# Patient Record
Sex: FEMALE | Race: WHITE | Employment: OTHER | URBAN - METROPOLITAN AREA
[De-identification: names, ages, dates, MRNs, and addresses within clinical notes are randomized per-mention and may not be internally consistent; named-entity substitution may affect disease eponyms.]

---

## 2017-01-01 ENCOUNTER — CARE COORDINATION (OUTPATIENT)
Dept: CARE COORDINATION | Age: 82
End: 2017-01-01

## 2017-01-01 ENCOUNTER — APPOINTMENT (OUTPATIENT)
Dept: GENERAL RADIOLOGY | Age: 82
End: 2017-01-01
Payer: MEDICARE

## 2017-01-01 ENCOUNTER — OFFICE VISIT (OUTPATIENT)
Dept: PODIATRY | Age: 82
End: 2017-01-01
Payer: MEDICARE

## 2017-01-01 ENCOUNTER — TELEPHONE (OUTPATIENT)
Dept: FAMILY MEDICINE CLINIC | Age: 82
End: 2017-01-01

## 2017-01-01 ENCOUNTER — HOSPITAL ENCOUNTER (EMERGENCY)
Age: 82
Discharge: HOME | End: 2017-11-24
Attending: EMERGENCY MEDICINE
Payer: MEDICARE

## 2017-01-01 ENCOUNTER — PROCEDURE VISIT (OUTPATIENT)
Dept: CARDIOLOGY | Age: 82
End: 2017-01-01
Payer: MEDICARE

## 2017-01-01 ENCOUNTER — OFFICE VISIT (OUTPATIENT)
Dept: FAMILY MEDICINE CLINIC | Age: 82
End: 2017-01-01
Payer: MEDICARE

## 2017-01-01 ENCOUNTER — OFFICE VISIT (OUTPATIENT)
Dept: PRIMARY CARE CLINIC | Age: 82
End: 2017-01-01
Payer: MEDICARE

## 2017-01-01 ENCOUNTER — HOSPITAL ENCOUNTER (OUTPATIENT)
Dept: GENERAL RADIOLOGY | Age: 82
End: 2017-01-01
Payer: MEDICARE

## 2017-01-01 ENCOUNTER — TELEPHONE (OUTPATIENT)
Dept: INTERNAL MEDICINE | Age: 82
End: 2017-01-01

## 2017-01-01 ENCOUNTER — OFFICE VISIT (OUTPATIENT)
Dept: ORTHOPEDIC SURGERY | Age: 82
End: 2017-01-01
Payer: MEDICARE

## 2017-01-01 ENCOUNTER — HOSPITAL ENCOUNTER (OUTPATIENT)
Age: 82
Setting detail: SPECIMEN
Discharge: HOME | End: 2017-10-02
Payer: MEDICARE

## 2017-01-01 ENCOUNTER — APPOINTMENT (OUTPATIENT)
Dept: CT IMAGING | Age: 82
End: 2017-01-01
Payer: MEDICARE

## 2017-01-01 ENCOUNTER — TELEPHONE (OUTPATIENT)
Dept: FAMILY MEDICINE CLINIC | Age: 82
End: 2017-01-01
Payer: MEDICARE

## 2017-01-01 ENCOUNTER — APPOINTMENT (OUTPATIENT)
Dept: INTERVENTIONAL RADIOLOGY/VASCULAR | Age: 82
End: 2017-01-01
Payer: MEDICARE

## 2017-01-01 ENCOUNTER — HOSPITAL ENCOUNTER (EMERGENCY)
Age: 82
Discharge: HOME | End: 2017-08-05
Attending: EMERGENCY MEDICINE
Payer: MEDICARE

## 2017-01-01 ENCOUNTER — HOSPITAL ENCOUNTER (OUTPATIENT)
Dept: GENERAL RADIOLOGY | Age: 82
Discharge: HOME | End: 2017-09-13
Payer: MEDICARE

## 2017-01-01 ENCOUNTER — OFFICE VISIT (OUTPATIENT)
Dept: CARDIOLOGY | Age: 82
End: 2017-01-01
Payer: MEDICARE

## 2017-01-01 ENCOUNTER — CARE COORDINATOR VISIT (OUTPATIENT)
Dept: CARE COORDINATION | Age: 82
End: 2017-01-01

## 2017-01-01 ENCOUNTER — HOSPITAL ENCOUNTER (EMERGENCY)
Age: 82
Discharge: HOME | End: 2017-11-19
Attending: EMERGENCY MEDICINE
Payer: MEDICARE

## 2017-01-01 ENCOUNTER — HOSPITAL ENCOUNTER (EMERGENCY)
Age: 82
Discharge: HOME | End: 2017-07-13
Attending: EMERGENCY MEDICINE
Payer: MEDICARE

## 2017-01-01 ENCOUNTER — HOSPITAL ENCOUNTER (OUTPATIENT)
Age: 82
Setting detail: SPECIMEN
Discharge: HOME | End: 2017-11-28
Payer: MEDICARE

## 2017-01-01 VITALS
SYSTOLIC BLOOD PRESSURE: 145 MMHG | OXYGEN SATURATION: 95 % | HEART RATE: 70 BPM | HEIGHT: 64 IN | DIASTOLIC BLOOD PRESSURE: 83 MMHG | WEIGHT: 135 LBS | BODY MASS INDEX: 23.05 KG/M2 | RESPIRATION RATE: 12 BRPM

## 2017-01-01 VITALS
SYSTOLIC BLOOD PRESSURE: 124 MMHG | HEART RATE: 76 BPM | BODY MASS INDEX: 23.49 KG/M2 | DIASTOLIC BLOOD PRESSURE: 76 MMHG | WEIGHT: 137.6 LBS | HEIGHT: 64 IN | RESPIRATION RATE: 20 BRPM

## 2017-01-01 VITALS
BODY MASS INDEX: 23.39 KG/M2 | HEART RATE: 91 BPM | DIASTOLIC BLOOD PRESSURE: 76 MMHG | RESPIRATION RATE: 18 BRPM | HEIGHT: 64 IN | SYSTOLIC BLOOD PRESSURE: 124 MMHG | OXYGEN SATURATION: 98 % | WEIGHT: 137 LBS

## 2017-01-01 VITALS
HEART RATE: 67 BPM | BODY MASS INDEX: 22.02 KG/M2 | RESPIRATION RATE: 13 BRPM | OXYGEN SATURATION: 97 % | DIASTOLIC BLOOD PRESSURE: 58 MMHG | WEIGHT: 129 LBS | TEMPERATURE: 97.9 F | HEIGHT: 64 IN | SYSTOLIC BLOOD PRESSURE: 123 MMHG

## 2017-01-01 VITALS
OXYGEN SATURATION: 98 % | DIASTOLIC BLOOD PRESSURE: 56 MMHG | HEART RATE: 67 BPM | RESPIRATION RATE: 12 BRPM | SYSTOLIC BLOOD PRESSURE: 109 MMHG | TEMPERATURE: 97.7 F

## 2017-01-01 VITALS
HEART RATE: 72 BPM | SYSTOLIC BLOOD PRESSURE: 145 MMHG | SYSTOLIC BLOOD PRESSURE: 128 MMHG | RESPIRATION RATE: 12 BRPM | WEIGHT: 135 LBS | BODY MASS INDEX: 23.17 KG/M2 | TEMPERATURE: 97.8 F | DIASTOLIC BLOOD PRESSURE: 73 MMHG | OXYGEN SATURATION: 99 % | WEIGHT: 133 LBS | OXYGEN SATURATION: 97 % | DIASTOLIC BLOOD PRESSURE: 74 MMHG | BODY MASS INDEX: 22.83 KG/M2 | HEART RATE: 67 BPM

## 2017-01-01 VITALS
HEIGHT: 64 IN | HEART RATE: 60 BPM | TEMPERATURE: 98 F | SYSTOLIC BLOOD PRESSURE: 102 MMHG | BODY MASS INDEX: 22.2 KG/M2 | DIASTOLIC BLOOD PRESSURE: 60 MMHG | OXYGEN SATURATION: 97 % | WEIGHT: 130 LBS

## 2017-01-01 VITALS
HEIGHT: 64 IN | HEART RATE: 68 BPM | WEIGHT: 137.2 LBS | RESPIRATION RATE: 20 BRPM | DIASTOLIC BLOOD PRESSURE: 60 MMHG | SYSTOLIC BLOOD PRESSURE: 118 MMHG | BODY MASS INDEX: 23.42 KG/M2

## 2017-01-01 VITALS
DIASTOLIC BLOOD PRESSURE: 68 MMHG | BODY MASS INDEX: 23.76 KG/M2 | SYSTOLIC BLOOD PRESSURE: 122 MMHG | WEIGHT: 138.4 LBS | HEART RATE: 60 BPM

## 2017-01-01 VITALS
BODY MASS INDEX: 23.05 KG/M2 | SYSTOLIC BLOOD PRESSURE: 110 MMHG | HEIGHT: 64 IN | DIASTOLIC BLOOD PRESSURE: 60 MMHG | HEART RATE: 75 BPM | WEIGHT: 135 LBS

## 2017-01-01 VITALS
WEIGHT: 137 LBS | DIASTOLIC BLOOD PRESSURE: 72 MMHG | WEIGHT: 135 LBS | BODY MASS INDEX: 23.05 KG/M2 | SYSTOLIC BLOOD PRESSURE: 128 MMHG | HEART RATE: 60 BPM | HEART RATE: 72 BPM | DIASTOLIC BLOOD PRESSURE: 68 MMHG | HEIGHT: 64 IN | SYSTOLIC BLOOD PRESSURE: 100 MMHG | BODY MASS INDEX: 23.39 KG/M2 | HEIGHT: 64 IN

## 2017-01-01 VITALS
SYSTOLIC BLOOD PRESSURE: 118 MMHG | WEIGHT: 136 LBS | HEIGHT: 64 IN | DIASTOLIC BLOOD PRESSURE: 64 MMHG | HEART RATE: 72 BPM | OXYGEN SATURATION: 98 % | BODY MASS INDEX: 23.22 KG/M2

## 2017-01-01 VITALS
RESPIRATION RATE: 18 BRPM | WEIGHT: 135.4 LBS | BODY MASS INDEX: 23.12 KG/M2 | SYSTOLIC BLOOD PRESSURE: 108 MMHG | HEART RATE: 72 BPM | DIASTOLIC BLOOD PRESSURE: 60 MMHG | HEIGHT: 64 IN

## 2017-01-01 DIAGNOSIS — Z91.81 HISTORY OF FALL: ICD-10-CM

## 2017-01-01 DIAGNOSIS — I11.0 HYPERTENSIVE HEART DISEASE WITH HEART FAILURE (HCC): Primary | ICD-10-CM

## 2017-01-01 DIAGNOSIS — I25.2 OLD MYOCARDIAL INFARCTION: ICD-10-CM

## 2017-01-01 DIAGNOSIS — J40 BRONCHITIS: Primary | ICD-10-CM

## 2017-01-01 DIAGNOSIS — R53.1 GENERAL WEAKNESS: ICD-10-CM

## 2017-01-01 DIAGNOSIS — K21.9 GASTROESOPHAGEAL REFLUX DISEASE WITHOUT ESOPHAGITIS: ICD-10-CM

## 2017-01-01 DIAGNOSIS — I49.5 SICK SINUS SYNDROME (HCC): Primary | ICD-10-CM

## 2017-01-01 DIAGNOSIS — M25.561 CHRONIC PAIN OF BOTH KNEES: ICD-10-CM

## 2017-01-01 DIAGNOSIS — I25.5 ISCHEMIC CARDIOMYOPATHY: ICD-10-CM

## 2017-01-01 DIAGNOSIS — I50.9 CONGESTIVE HEART FAILURE, UNSPECIFIED CONGESTIVE HEART FAILURE CHRONICITY, UNSPECIFIED CONGESTIVE HEART FAILURE TYPE: ICD-10-CM

## 2017-01-01 DIAGNOSIS — M62.81 MUSCLE WEAKNESS (GENERALIZED): ICD-10-CM

## 2017-01-01 DIAGNOSIS — M54.2 NECK PAIN ON LEFT SIDE: ICD-10-CM

## 2017-01-01 DIAGNOSIS — I50.9 CONGESTIVE HEART FAILURE, UNSPECIFIED CONGESTIVE HEART FAILURE CHRONICITY, UNSPECIFIED CONGESTIVE HEART FAILURE TYPE: Primary | ICD-10-CM

## 2017-01-01 DIAGNOSIS — M54.30 SCIATICA, UNSPECIFIED LATERALITY: ICD-10-CM

## 2017-01-01 DIAGNOSIS — I47.20 VENTRICULAR TACHYCARDIA: ICD-10-CM

## 2017-01-01 DIAGNOSIS — Z87.440 PERSONAL HISTORY OF URINARY TRACT INFECTION: ICD-10-CM

## 2017-01-01 DIAGNOSIS — I48.91 ATRIAL FIBRILLATION, UNSPECIFIED TYPE (HCC): ICD-10-CM

## 2017-01-01 DIAGNOSIS — K59.00 CONSTIPATION, UNSPECIFIED CONSTIPATION TYPE: ICD-10-CM

## 2017-01-01 DIAGNOSIS — E78.5 HYPERLIPIDEMIA, UNSPECIFIED HYPERLIPIDEMIA TYPE: Primary | ICD-10-CM

## 2017-01-01 DIAGNOSIS — Z91.81 PERSONAL HISTORY OF FALL: ICD-10-CM

## 2017-01-01 DIAGNOSIS — R26.9 GAIT ABNORMALITY: ICD-10-CM

## 2017-01-01 DIAGNOSIS — F41.9 ANXIETY: ICD-10-CM

## 2017-01-01 DIAGNOSIS — M54.31 SCIATICA OF RIGHT SIDE: Primary | ICD-10-CM

## 2017-01-01 DIAGNOSIS — I25.10 ATHEROSCLEROSIS OF NATIVE CORONARY ARTERY OF NATIVE HEART WITHOUT ANGINA PECTORIS: ICD-10-CM

## 2017-01-01 DIAGNOSIS — Z99.81 DEPENDENCE ON SUPPLEMENTAL OXYGEN: ICD-10-CM

## 2017-01-01 DIAGNOSIS — I50.32 CHRONIC DIASTOLIC CONGESTIVE HEART FAILURE (HCC): ICD-10-CM

## 2017-01-01 DIAGNOSIS — I49.5 SSS (SICK SINUS SYNDROME) (HCC): ICD-10-CM

## 2017-01-01 DIAGNOSIS — G89.29 CHRONIC PAIN OF BOTH KNEES: Primary | ICD-10-CM

## 2017-01-01 DIAGNOSIS — Z87.891 PERSONAL HISTORY OF TOBACCO USE, PRESENTING HAZARDS TO HEALTH: ICD-10-CM

## 2017-01-01 DIAGNOSIS — R53.1 WEAKNESS GENERALIZED: ICD-10-CM

## 2017-01-01 DIAGNOSIS — B35.1 DERMATOPHYTOSIS OF NAIL: ICD-10-CM

## 2017-01-01 DIAGNOSIS — N28.9 RENAL INSUFFICIENCY: ICD-10-CM

## 2017-01-01 DIAGNOSIS — L89.91: ICD-10-CM

## 2017-01-01 DIAGNOSIS — M25.562 CHRONIC PAIN OF BOTH KNEES: Primary | ICD-10-CM

## 2017-01-01 DIAGNOSIS — I49.5 SICK SINUS SYNDROME (HCC): ICD-10-CM

## 2017-01-01 DIAGNOSIS — Z95.0 CARDIAC PACEMAKER IN SITU: Primary | ICD-10-CM

## 2017-01-01 DIAGNOSIS — J44.9 CHRONIC OBSTRUCTIVE PULMONARY DISEASE, UNSPECIFIED COPD TYPE (HCC): Primary | ICD-10-CM

## 2017-01-01 DIAGNOSIS — R22.9 LOCAL SUPERFICIAL SWELLING, MASS OR LUMP: Primary | ICD-10-CM

## 2017-01-01 DIAGNOSIS — Z95.0 CARDIAC PACEMAKER IN SITU: ICD-10-CM

## 2017-01-01 DIAGNOSIS — R11.0 NAUSEA WITHOUT VOMITING: ICD-10-CM

## 2017-01-01 DIAGNOSIS — I25.10 CORONARY ARTERY DISEASE INVOLVING NATIVE CORONARY ARTERY OF NATIVE HEART WITHOUT ANGINA PECTORIS: ICD-10-CM

## 2017-01-01 DIAGNOSIS — K62.89 RECTAL IRRITATION: ICD-10-CM

## 2017-01-01 DIAGNOSIS — Z51.5 ENCOUNTER FOR PALLIATIVE CARE: ICD-10-CM

## 2017-01-01 DIAGNOSIS — B35.1 DERMATOPHYTOSIS OF NAIL: Primary | ICD-10-CM

## 2017-01-01 DIAGNOSIS — M17.0 BILATERAL PRIMARY OSTEOARTHRITIS OF KNEE: Primary | ICD-10-CM

## 2017-01-01 DIAGNOSIS — R29.898 WEAKNESS OF BOTH LOWER EXTREMITIES: Primary | ICD-10-CM

## 2017-01-01 DIAGNOSIS — R44.1 HALLUCINATIONS, VISUAL: Primary | ICD-10-CM

## 2017-01-01 DIAGNOSIS — M25.562 CHRONIC PAIN OF BOTH KNEES: ICD-10-CM

## 2017-01-01 DIAGNOSIS — M79.604 PAIN IN BOTH LOWER EXTREMITIES: Primary | ICD-10-CM

## 2017-01-01 DIAGNOSIS — R19.7 DIARRHEA OF PRESUMED INFECTIOUS ORIGIN: ICD-10-CM

## 2017-01-01 DIAGNOSIS — R53.1 WEAKNESS: ICD-10-CM

## 2017-01-01 DIAGNOSIS — G89.29 CHRONIC PAIN OF BOTH KNEES: ICD-10-CM

## 2017-01-01 DIAGNOSIS — I73.9 PVD (PERIPHERAL VASCULAR DISEASE) (HCC): Primary | ICD-10-CM

## 2017-01-01 DIAGNOSIS — R26.9 GAIT ABNORMALITY: Primary | ICD-10-CM

## 2017-01-01 DIAGNOSIS — E86.0 DEHYDRATION: Primary | ICD-10-CM

## 2017-01-01 DIAGNOSIS — M54.40 ACUTE LOW BACK PAIN WITH SCIATICA, SCIATICA LATERALITY UNSPECIFIED, UNSPECIFIED BACK PAIN LATERALITY: Primary | ICD-10-CM

## 2017-01-01 DIAGNOSIS — H35.3190 NONEXUDATIVE AGE-RELATED MACULAR DEGENERATION, UNSPECIFIED EYE, STAGE UNSPECIFIED: ICD-10-CM

## 2017-01-01 DIAGNOSIS — Z23 NEED FOR INFLUENZA VACCINATION: ICD-10-CM

## 2017-01-01 DIAGNOSIS — M79.605 PAIN IN BOTH LOWER EXTREMITIES: Primary | ICD-10-CM

## 2017-01-01 DIAGNOSIS — R60.9 PERIPHERAL EDEMA: ICD-10-CM

## 2017-01-01 DIAGNOSIS — R53.1 WEAKNESS: Primary | ICD-10-CM

## 2017-01-01 DIAGNOSIS — F32.4 MAJOR DEPRESSIVE DISORDER WITH SINGLE EPISODE, IN PARTIAL REMISSION (HCC): ICD-10-CM

## 2017-01-01 DIAGNOSIS — M25.561 CHRONIC PAIN OF BOTH KNEES: Primary | ICD-10-CM

## 2017-01-01 DIAGNOSIS — I10 ESSENTIAL HYPERTENSION: Primary | ICD-10-CM

## 2017-01-01 DIAGNOSIS — I10 ESSENTIAL HYPERTENSION: ICD-10-CM

## 2017-01-01 DIAGNOSIS — R60.9 PERIPHERAL EDEMA: Primary | ICD-10-CM

## 2017-01-01 DIAGNOSIS — L89.151 DECUBITUS ULCER OF SACRAL REGION, STAGE 1: ICD-10-CM

## 2017-01-01 DIAGNOSIS — R44.1 HALLUCINATIONS, VISUAL: ICD-10-CM

## 2017-01-01 DIAGNOSIS — R26.89 BALANCE DISORDER: ICD-10-CM

## 2017-01-01 LAB
-: ABNORMAL
-: ABNORMAL
ABSOLUTE EOS #: 0.1 K/UL (ref 0–0.4)
ABSOLUTE EOS #: 0.1 K/UL (ref 0–0.4)
ABSOLUTE IMMATURE GRANULOCYTE: ABNORMAL K/UL (ref 0–0.3)
ABSOLUTE LYMPH #: 1.1 K/UL (ref 1–4.8)
ABSOLUTE LYMPH #: 1.6 K/UL (ref 1–4.8)
ABSOLUTE MONO #: 0.4 K/UL (ref 0.1–1.2)
ABSOLUTE MONO #: 0.5 K/UL (ref 0.1–1.2)
AMORPHOUS: ABNORMAL
AMORPHOUS: ABNORMAL
ANION GAP SERPL CALCULATED.3IONS-SCNC: 11 MMOL/L (ref 9–17)
ANION GAP SERPL CALCULATED.3IONS-SCNC: 15 MMOL/L (ref 9–17)
BACTERIA: ABNORMAL
BACTERIA: ABNORMAL
BASOPHILS # BLD: 1 %
BASOPHILS # BLD: 1 % (ref 0–1)
BASOPHILS ABSOLUTE: 0 K/UL (ref 0–0.2)
BASOPHILS ABSOLUTE: 0 K/UL (ref 0–0.2)
BILIRUBIN URINE: NEGATIVE
BILIRUBIN URINE: NEGATIVE
BNP INTERPRETATION: ABNORMAL
BUN BLDV-MCNC: 22 MG/DL (ref 8–23)
BUN BLDV-MCNC: 25 MG/DL (ref 8–23)
BUN/CREAT BLD: 23 (ref 9–20)
BUN/CREAT BLD: 24 (ref 9–20)
CALCIUM SERPL-MCNC: 9.1 MG/DL (ref 8.6–10.4)
CALCIUM SERPL-MCNC: 9.5 MG/DL (ref 8.6–10.4)
CASTS UA: ABNORMAL /LPF (ref 0–2)
CASTS UA: ABNORMAL /LPF (ref 0–2)
CHLORIDE BLD-SCNC: 92 MMOL/L (ref 98–107)
CHLORIDE BLD-SCNC: 94 MMOL/L (ref 98–107)
CO2: 27 MMOL/L (ref 20–31)
CO2: 29 MMOL/L (ref 20–31)
COLOR: ABNORMAL
COLOR: ABNORMAL
COMMENT UA: ABNORMAL
COMMENT UA: ABNORMAL
CREAT SERPL-MCNC: 0.97 MG/DL (ref 0.5–0.9)
CREAT SERPL-MCNC: 1.04 MG/DL (ref 0.5–0.9)
CRYSTALS, UA: ABNORMAL /HPF
CRYSTALS, UA: ABNORMAL /HPF
CULTURE: NORMAL
CULTURE: NORMAL
DIFFERENTIAL TYPE: ABNORMAL
DIFFERENTIAL TYPE: ABNORMAL
DIRECT EXAM: NORMAL
EOSINOPHILS RELATIVE PERCENT: 2 %
EOSINOPHILS RELATIVE PERCENT: 2 % (ref 1–7)
EPITHELIAL CELLS UA: ABNORMAL /HPF (ref 0–5)
EPITHELIAL CELLS UA: ABNORMAL /HPF (ref 0–5)
GFR AFRICAN AMERICAN: ABNORMAL ML/MIN
GFR AFRICAN AMERICAN: ABNORMAL ML/MIN
GFR NON-AFRICAN AMERICAN: ABNORMAL ML/MIN
GFR NON-AFRICAN AMERICAN: ABNORMAL ML/MIN
GFR SERPL CREATININE-BSD FRML MDRD: ABNORMAL ML/MIN/{1.73_M2}
GLUCOSE BLD-MCNC: 106 MG/DL (ref 70–99)
GLUCOSE BLD-MCNC: 130 MG/DL (ref 70–99)
GLUCOSE URINE: NEGATIVE
GLUCOSE URINE: NEGATIVE
HCT VFR BLD CALC: 28.6 % (ref 36–46)
HCT VFR BLD CALC: 34 % (ref 36–46)
HEMOGLOBIN: 11.3 G/DL (ref 12–16)
HEMOGLOBIN: 9.2 G/DL (ref 12–16)
IMMATURE GRANULOCYTES: ABNORMAL %
KETONES, URINE: NEGATIVE
KETONES, URINE: NEGATIVE
LEUKOCYTE ESTERASE, URINE: ABNORMAL
LEUKOCYTE ESTERASE, URINE: ABNORMAL
LYMPHOCYTES # BLD: 21 %
LYMPHOCYTES # BLD: 24 % (ref 16–46)
Lab: NORMAL
Lab: NORMAL
MCH RBC QN AUTO: 33.9 PG (ref 26–34)
MCH RBC QN AUTO: 35.4 PG (ref 26–34)
MCHC RBC AUTO-ENTMCNC: 32 G/DL (ref 31–37)
MCHC RBC AUTO-ENTMCNC: 33.1 G/DL (ref 31–37)
MCV RBC AUTO: 105.9 FL (ref 80–100)
MCV RBC AUTO: 106.9 FL (ref 80–100)
MONOCYTES # BLD: 7 % (ref 4–11)
MONOCYTES # BLD: 8 %
MUCUS: ABNORMAL
MUCUS: ABNORMAL
NITRITE, URINE: NEGATIVE
NITRITE, URINE: NEGATIVE
OTHER OBSERVATIONS UA: ABNORMAL
OTHER OBSERVATIONS UA: ABNORMAL
PDW BLD-RTO: 14.1 % (ref 11–14.5)
PDW BLD-RTO: 14.8 % (ref 11–14.5)
PH UA: 5.5 (ref 5–6)
PH UA: 6 (ref 5–6)
PLATELET # BLD: 185 K/UL (ref 140–450)
PLATELET # BLD: 193 K/UL (ref 140–450)
PLATELET ESTIMATE: ABNORMAL
PLATELET ESTIMATE: ABNORMAL
PMV BLD AUTO: 8 FL (ref 6–12)
PMV BLD AUTO: 8.8 FL (ref 6–12)
POTASSIUM SERPL-SCNC: 4.1 MMOL/L (ref 3.7–5.3)
POTASSIUM SERPL-SCNC: 4.4 MMOL/L (ref 3.7–5.3)
PRO-BNP: 3460 PG/ML
PROTEIN UA: NEGATIVE
PROTEIN UA: NEGATIVE
RBC # BLD: 2.7 M/UL (ref 4–5.2)
RBC # BLD: 3.19 M/UL (ref 4–5.2)
RBC # BLD: ABNORMAL 10*6/UL
RBC # BLD: ABNORMAL 10*6/UL
RBC UA: ABNORMAL /HPF (ref 0–4)
RBC UA: ABNORMAL /HPF (ref 0–4)
RENAL EPITHELIAL, UA: ABNORMAL /HPF
RENAL EPITHELIAL, UA: ABNORMAL /HPF
SEG NEUTROPHILS: 66 % (ref 43–77)
SEG NEUTROPHILS: 68 %
SEGMENTED NEUTROPHILS ABSOLUTE COUNT: 3.7 K/UL (ref 1.8–7.7)
SEGMENTED NEUTROPHILS ABSOLUTE COUNT: 4.4 K/UL (ref 1.8–7.7)
SODIUM BLD-SCNC: 134 MMOL/L (ref 135–144)
SODIUM BLD-SCNC: 134 MMOL/L (ref 135–144)
SPECIFIC GRAVITY UA: 1 (ref 1.01–1.02)
SPECIFIC GRAVITY UA: 1 (ref 1.01–1.02)
SPECIMEN DESCRIPTION: NORMAL
STATUS: NORMAL
STATUS: NORMAL
TRICHOMONAS: ABNORMAL
TRICHOMONAS: ABNORMAL
TURBIDITY: ABNORMAL
TURBIDITY: ABNORMAL
URINE HGB: ABNORMAL
URINE HGB: NEGATIVE
UROBILINOGEN, URINE: NORMAL
UROBILINOGEN, URINE: NORMAL
WBC # BLD: 5.4 K/UL (ref 3.5–11)
WBC # BLD: 6.6 K/UL (ref 3.5–11)
WBC # BLD: ABNORMAL 10*3/UL
WBC # BLD: ABNORMAL 10*3/UL
WBC UA: ABNORMAL /HPF (ref 0–4)
WBC UA: ABNORMAL /HPF (ref 0–4)
YEAST: ABNORMAL
YEAST: ABNORMAL

## 2017-01-01 PROCEDURE — 1036F TOBACCO NON-USER: CPT | Performed by: ORTHOPAEDIC SURGERY

## 2017-01-01 PROCEDURE — 96372 THER/PROPH/DIAG INJ SC/IM: CPT

## 2017-01-01 PROCEDURE — G8427 DOCREV CUR MEDS BY ELIG CLIN: HCPCS | Performed by: FAMILY MEDICINE

## 2017-01-01 PROCEDURE — 87493 C DIFF AMPLIFIED PROBE: CPT

## 2017-01-01 PROCEDURE — 81001 URINALYSIS AUTO W/SCOPE: CPT

## 2017-01-01 PROCEDURE — 1123F ACP DISCUSS/DSCN MKR DOCD: CPT | Performed by: NURSE PRACTITIONER

## 2017-01-01 PROCEDURE — G8420 CALC BMI NORM PARAMETERS: HCPCS | Performed by: FAMILY MEDICINE

## 2017-01-01 PROCEDURE — 11721 DEBRIDE NAIL 6 OR MORE: CPT | Performed by: PODIATRIST

## 2017-01-01 PROCEDURE — 93000 ELECTROCARDIOGRAM COMPLETE: CPT | Performed by: INTERNAL MEDICINE

## 2017-01-01 PROCEDURE — 99213 OFFICE O/P EST LOW 20 MIN: CPT | Performed by: NURSE PRACTITIONER

## 2017-01-01 PROCEDURE — 1036F TOBACCO NON-USER: CPT | Performed by: PODIATRIST

## 2017-01-01 PROCEDURE — 99283 EMERGENCY DEPT VISIT LOW MDM: CPT

## 2017-01-01 PROCEDURE — 99214 OFFICE O/P EST MOD 30 MIN: CPT | Performed by: FAMILY MEDICINE

## 2017-01-01 PROCEDURE — 1123F ACP DISCUSS/DSCN MKR DOCD: CPT | Performed by: FAMILY MEDICINE

## 2017-01-01 PROCEDURE — 4040F PNEUMOC VAC/ADMIN/RCVD: CPT | Performed by: FAMILY MEDICINE

## 2017-01-01 PROCEDURE — G0179 MD RECERTIFICATION HHA PT: HCPCS | Performed by: FAMILY MEDICINE

## 2017-01-01 PROCEDURE — G8420 CALC BMI NORM PARAMETERS: HCPCS | Performed by: NURSE PRACTITIONER

## 2017-01-01 PROCEDURE — G8427 DOCREV CUR MEDS BY ELIG CLIN: HCPCS | Performed by: NURSE PRACTITIONER

## 2017-01-01 PROCEDURE — 80048 BASIC METABOLIC PNL TOTAL CA: CPT

## 2017-01-01 PROCEDURE — 70450 CT HEAD/BRAIN W/O DYE: CPT

## 2017-01-01 PROCEDURE — 2580000003 HC RX 258: Performed by: EMERGENCY MEDICINE

## 2017-01-01 PROCEDURE — 1090F PRES/ABSN URINE INCON ASSESS: CPT | Performed by: FAMILY MEDICINE

## 2017-01-01 PROCEDURE — 85025 COMPLETE CBC W/AUTO DIFF WBC: CPT

## 2017-01-01 PROCEDURE — G8427 DOCREV CUR MEDS BY ELIG CLIN: HCPCS | Performed by: ORTHOPAEDIC SURGERY

## 2017-01-01 PROCEDURE — 6360000002 HC RX W HCPCS: Performed by: EMERGENCY MEDICINE

## 2017-01-01 PROCEDURE — 6370000000 HC RX 637 (ALT 250 FOR IP): Performed by: EMERGENCY MEDICINE

## 2017-01-01 PROCEDURE — 1090F PRES/ABSN URINE INCON ASSESS: CPT | Performed by: ORTHOPAEDIC SURGERY

## 2017-01-01 PROCEDURE — 20610 DRAIN/INJ JOINT/BURSA W/O US: CPT | Performed by: ORTHOPAEDIC SURGERY

## 2017-01-01 PROCEDURE — 99214 OFFICE O/P EST MOD 30 MIN: CPT | Performed by: INTERNAL MEDICINE

## 2017-01-01 PROCEDURE — 1036F TOBACCO NON-USER: CPT | Performed by: FAMILY MEDICINE

## 2017-01-01 PROCEDURE — 90662 IIV NO PRSV INCREASED AG IM: CPT | Performed by: FAMILY MEDICINE

## 2017-01-01 PROCEDURE — 4040F PNEUMOC VAC/ADMIN/RCVD: CPT | Performed by: ORTHOPAEDIC SURGERY

## 2017-01-01 PROCEDURE — G0008 ADMIN INFLUENZA VIRUS VAC: HCPCS | Performed by: FAMILY MEDICINE

## 2017-01-01 PROCEDURE — 99284 EMERGENCY DEPT VISIT MOD MDM: CPT

## 2017-01-01 PROCEDURE — G8420 CALC BMI NORM PARAMETERS: HCPCS | Performed by: ORTHOPAEDIC SURGERY

## 2017-01-01 PROCEDURE — 73502 X-RAY EXAM HIP UNI 2-3 VIEWS: CPT

## 2017-01-01 PROCEDURE — G0180 MD CERTIFICATION HHA PATIENT: HCPCS | Performed by: FAMILY MEDICINE

## 2017-01-01 PROCEDURE — 73502 X-RAY EXAM HIP UNI 2-3 VIEWS: CPT | Performed by: RADIOLOGY

## 2017-01-01 PROCEDURE — G8598 ASA/ANTIPLAT THER USED: HCPCS | Performed by: FAMILY MEDICINE

## 2017-01-01 PROCEDURE — 73562 X-RAY EXAM OF KNEE 3: CPT

## 2017-01-01 PROCEDURE — 99213 OFFICE O/P EST LOW 20 MIN: CPT | Performed by: FAMILY MEDICINE

## 2017-01-01 PROCEDURE — G8598 ASA/ANTIPLAT THER USED: HCPCS | Performed by: NURSE PRACTITIONER

## 2017-01-01 PROCEDURE — G8484 FLU IMMUNIZE NO ADMIN: HCPCS | Performed by: FAMILY MEDICINE

## 2017-01-01 PROCEDURE — 70450 CT HEAD/BRAIN W/O DYE: CPT | Performed by: RADIOLOGY

## 2017-01-01 PROCEDURE — 1123F ACP DISCUSS/DSCN MKR DOCD: CPT | Performed by: ORTHOPAEDIC SURGERY

## 2017-01-01 PROCEDURE — 93971 EXTREMITY STUDY: CPT | Performed by: RADIOLOGY

## 2017-01-01 PROCEDURE — 1090F PRES/ABSN URINE INCON ASSESS: CPT | Performed by: NURSE PRACTITIONER

## 2017-01-01 PROCEDURE — 99285 EMERGENCY DEPT VISIT HI MDM: CPT

## 2017-01-01 PROCEDURE — 93288 INTERROG EVL PM/LDLS PM IP: CPT | Performed by: INTERNAL MEDICINE

## 2017-01-01 PROCEDURE — 72100 X-RAY EXAM L-S SPINE 2/3 VWS: CPT

## 2017-01-01 PROCEDURE — 96361 HYDRATE IV INFUSION ADD-ON: CPT

## 2017-01-01 PROCEDURE — G8598 ASA/ANTIPLAT THER USED: HCPCS | Performed by: ORTHOPAEDIC SURGERY

## 2017-01-01 PROCEDURE — 36415 COLL VENOUS BLD VENIPUNCTURE: CPT

## 2017-01-01 PROCEDURE — 93971 EXTREMITY STUDY: CPT

## 2017-01-01 PROCEDURE — 4040F PNEUMOC VAC/ADMIN/RCVD: CPT | Performed by: NURSE PRACTITIONER

## 2017-01-01 PROCEDURE — 96374 THER/PROPH/DIAG INJ IV PUSH: CPT

## 2017-01-01 PROCEDURE — 83880 ASSAY OF NATRIURETIC PEPTIDE: CPT

## 2017-01-01 PROCEDURE — 1036F TOBACCO NON-USER: CPT | Performed by: NURSE PRACTITIONER

## 2017-01-01 PROCEDURE — 87086 URINE CULTURE/COLONY COUNT: CPT

## 2017-01-01 PROCEDURE — 99212 OFFICE O/P EST SF 10 MIN: CPT | Performed by: ORTHOPAEDIC SURGERY

## 2017-01-01 RX ORDER — SIMVASTATIN 10 MG
TABLET ORAL
Qty: 90 TABLET | Refills: 3 | Status: SHIPPED | OUTPATIENT
Start: 2017-01-01

## 2017-01-01 RX ORDER — ALBUTEROL SULFATE 2.5 MG/3ML
2.5 SOLUTION RESPIRATORY (INHALATION) EVERY 6 HOURS PRN
Qty: 120 VIAL | Refills: 1 | Status: SHIPPED | OUTPATIENT
Start: 2017-01-01

## 2017-01-01 RX ORDER — 0.9 % SODIUM CHLORIDE 0.9 %
500 INTRAVENOUS SOLUTION INTRAVENOUS ONCE
Status: COMPLETED | OUTPATIENT
Start: 2017-01-01 | End: 2017-01-01

## 2017-01-01 RX ORDER — MAGNESIUM GLUCONATE 27 MG(500)
250 TABLET ORAL 2 TIMES DAILY
COMMUNITY

## 2017-01-01 RX ORDER — MECLIZINE HYDROCHLORIDE 25 MG/1
TABLET ORAL
Qty: 40 TABLET | Refills: 2 | Status: SHIPPED | OUTPATIENT
Start: 2017-01-01

## 2017-01-01 RX ORDER — ALLOPURINOL 100 MG/1
200 TABLET ORAL DAILY
Qty: 180 TABLET | Refills: 3 | Status: SHIPPED | OUTPATIENT
Start: 2017-01-01

## 2017-01-01 RX ORDER — LORAZEPAM 0.5 MG/1
TABLET ORAL
Qty: 60 TABLET | Refills: 0 | Status: SHIPPED | OUTPATIENT
Start: 2017-01-01 | End: 2017-01-01 | Stop reason: SDUPTHER

## 2017-01-01 RX ORDER — METHYLPREDNISOLONE ACETATE 40 MG/ML
40 INJECTION, SUSPENSION INTRA-ARTICULAR; INTRALESIONAL; INTRAMUSCULAR; SOFT TISSUE ONCE
Status: COMPLETED | OUTPATIENT
Start: 2017-01-01 | End: 2017-01-01

## 2017-01-01 RX ORDER — ONDANSETRON 4 MG/1
4 TABLET, FILM COATED ORAL EVERY 8 HOURS PRN
Qty: 40 TABLET | Refills: 2 | Status: SHIPPED | OUTPATIENT
Start: 2017-01-01

## 2017-01-01 RX ORDER — FLUTICASONE PROPIONATE 50 MCG
1 SPRAY, SUSPENSION (ML) NASAL DAILY
COMMUNITY
End: 2017-01-01 | Stop reason: ALTCHOICE

## 2017-01-01 RX ORDER — SPIRONOLACTONE 25 MG/1
TABLET ORAL
Qty: 90 TABLET | Refills: 1 | Status: SHIPPED | OUTPATIENT
Start: 2017-01-01

## 2017-01-01 RX ORDER — PREDNISONE 20 MG/1
20 TABLET ORAL 2 TIMES DAILY
Qty: 10 TABLET | Refills: 0 | Status: SHIPPED | OUTPATIENT
Start: 2017-01-01 | End: 2017-01-01

## 2017-01-01 RX ORDER — KETOROLAC TROMETHAMINE 30 MG/ML
30 INJECTION, SOLUTION INTRAMUSCULAR; INTRAVENOUS ONCE
Status: COMPLETED | OUTPATIENT
Start: 2017-01-01 | End: 2017-01-01

## 2017-01-01 RX ORDER — MORPHINE SULFATE 2 MG/ML
2 INJECTION, SOLUTION INTRAMUSCULAR; INTRAVENOUS ONCE
Status: COMPLETED | OUTPATIENT
Start: 2017-01-01 | End: 2017-01-01

## 2017-01-01 RX ORDER — OMEPRAZOLE 20 MG/1
CAPSULE, DELAYED RELEASE ORAL
Qty: 180 CAPSULE | Refills: 1 | Status: SHIPPED | OUTPATIENT
Start: 2017-01-01 | End: 2018-01-01 | Stop reason: SDUPTHER

## 2017-01-01 RX ORDER — DEXAMETHASONE SODIUM PHOSPHATE 10 MG/ML
10 INJECTION INTRAMUSCULAR; INTRAVENOUS ONCE
Status: COMPLETED | OUTPATIENT
Start: 2017-01-01 | End: 2017-01-01

## 2017-01-01 RX ORDER — LORATADINE 10 MG/1
TABLET ORAL
Qty: 30 TABLET | Refills: 11 | Status: SHIPPED | OUTPATIENT
Start: 2017-01-01

## 2017-01-01 RX ORDER — BUMETANIDE 1 MG/1
TABLET ORAL
Qty: 90 TABLET | Refills: 1 | Status: SHIPPED | OUTPATIENT
Start: 2017-01-01 | End: 2018-01-01 | Stop reason: SDUPTHER

## 2017-01-01 RX ORDER — LIDOCAINE HYDROCHLORIDE 10 MG/ML
4 INJECTION, SOLUTION INFILTRATION; PERINEURAL ONCE
Status: COMPLETED | OUTPATIENT
Start: 2017-01-01 | End: 2017-01-01

## 2017-01-01 RX ORDER — DIPHENOXYLATE HYDROCHLORIDE AND ATROPINE SULFATE 2.5; .025 MG/1; MG/1
1 TABLET ORAL 4 TIMES DAILY PRN
Qty: 20 TABLET | Refills: 0 | Status: SHIPPED | OUTPATIENT
Start: 2017-01-01

## 2017-01-01 RX ORDER — POLYETHYLENE GLYCOL 3350 17 G/17G
17 POWDER, FOR SOLUTION ORAL DAILY
Qty: 510 G | Refills: 3 | Status: SHIPPED | OUTPATIENT
Start: 2017-01-01

## 2017-01-01 RX ORDER — LISINOPRIL 5 MG/1
5 TABLET ORAL DAILY
Qty: 30 TABLET | Refills: 1 | Status: SHIPPED | OUTPATIENT
Start: 2017-01-01

## 2017-01-01 RX ORDER — PREDNISONE 50 MG/1
50 TABLET ORAL DAILY
Qty: 5 TABLET | Refills: 0 | Status: SHIPPED | OUTPATIENT
Start: 2017-01-01 | End: 2017-01-01

## 2017-01-01 RX ORDER — LORAZEPAM 0.5 MG/1
TABLET ORAL
Qty: 60 TABLET | Refills: 0 | OUTPATIENT
Start: 2017-01-01

## 2017-01-01 RX ORDER — BUMETANIDE 1 MG/1
1 TABLET ORAL DAILY
Status: DISCONTINUED | OUTPATIENT
Start: 2017-01-01 | End: 2017-01-01 | Stop reason: HOSPADM

## 2017-01-01 RX ORDER — LORAZEPAM 0.5 MG/1
TABLET ORAL
Qty: 60 TABLET | Refills: 0 | Status: SHIPPED | OUTPATIENT
Start: 2017-01-01 | End: 2018-01-01 | Stop reason: SDUPTHER

## 2017-01-01 RX ORDER — AZITHROMYCIN 250 MG/1
TABLET, FILM COATED ORAL
Qty: 1 PACKET | Refills: 0 | Status: SHIPPED | OUTPATIENT
Start: 2017-01-01 | End: 2017-01-01

## 2017-01-01 RX ADMIN — LIDOCAINE HYDROCHLORIDE 4 ML: 10 INJECTION, SOLUTION INFILTRATION; PERINEURAL at 17:08

## 2017-01-01 RX ADMIN — DEXAMETHASONE SODIUM PHOSPHATE 10 MG: 10 INJECTION INTRAMUSCULAR; INTRAVENOUS at 18:25

## 2017-01-01 RX ADMIN — METHYLPREDNISOLONE ACETATE 40 MG: 40 INJECTION, SUSPENSION INTRA-ARTICULAR; INTRALESIONAL; INTRAMUSCULAR; SOFT TISSUE at 17:09

## 2017-01-01 RX ADMIN — KETOROLAC TROMETHAMINE 30 MG: 30 INJECTION, SOLUTION INTRAMUSCULAR at 21:10

## 2017-01-01 RX ADMIN — SODIUM CHLORIDE 500 ML: 9 INJECTION, SOLUTION INTRAVENOUS at 17:01

## 2017-01-01 RX ADMIN — MORPHINE SULFATE 2 MG: 2 INJECTION, SOLUTION INTRAMUSCULAR; INTRAVENOUS at 19:35

## 2017-01-01 RX ADMIN — BUMETANIDE 1 MG: 1 TABLET ORAL at 16:15

## 2017-01-01 ASSESSMENT — ENCOUNTER SYMPTOMS
DIARRHEA: 1
SHORTNESS OF BREATH: 0
CONSTIPATION: 0
ABDOMINAL PAIN: 0
BACK PAIN: 1
RECTAL PAIN: 0
ABDOMINAL PAIN: 0
DIARRHEA: 1
VOMITING: 0
CONSTIPATION: 0
RECTAL PAIN: 0
RECTAL PAIN: 0
COUGH: 0
NAUSEA: 0
WHEEZING: 0
BLOOD IN STOOL: 0
BACK PAIN: 0
NAUSEA: 0
SORE THROAT: 0
SHORTNESS OF BREATH: 0
ABDOMINAL DISTENTION: 0
RESPIRATORY NEGATIVE: 1
SHORTNESS OF BREATH: 0
RESPIRATORY NEGATIVE: 1
BLOOD IN STOOL: 0
SHORTNESS OF BREATH: 0
NAUSEA: 1
CHEST TIGHTNESS: 0
VOMITING: 0
ABDOMINAL PAIN: 0
ABDOMINAL PAIN: 0
RHINORRHEA: 1
EYE PAIN: 0
EYES NEGATIVE: 1
WHEEZING: 0
COUGH: 1

## 2017-01-01 ASSESSMENT — PAIN SCALES - GENERAL
PAINLEVEL_OUTOF10: 3
PAINLEVEL_OUTOF10: 0
PAINLEVEL_OUTOF10: 5
PAINLEVEL_OUTOF10: 3

## 2017-01-01 ASSESSMENT — PATIENT HEALTH QUESTIONNAIRE - PHQ9
2. FEELING DOWN, DEPRESSED OR HOPELESS: 0
SUM OF ALL RESPONSES TO PHQ9 QUESTIONS 1 & 2: 0
SUM OF ALL RESPONSES TO PHQ QUESTIONS 1-9: 0
1. LITTLE INTEREST OR PLEASURE IN DOING THINGS: 0

## 2017-01-01 ASSESSMENT — PAIN DESCRIPTION - FREQUENCY: FREQUENCY: INTERMITTENT

## 2017-01-01 ASSESSMENT — PAIN DESCRIPTION - LOCATION: LOCATION: BUTTOCKS

## 2017-01-04 ENCOUNTER — TELEPHONE (OUTPATIENT)
Dept: PRIMARY CARE CLINIC | Age: 82
End: 2017-01-04

## 2017-01-04 LAB
-: NORMAL
AMORPHOUS: NORMAL
BACTERIA: NORMAL
BILIRUBIN URINE: NEGATIVE
CASTS UA: NORMAL /LPF (ref 0–2)
COLOR: NORMAL
COMMENT UA: NORMAL
CRYSTALS, UA: NORMAL /HPF
DIRECT EXAM: NORMAL
EPITHELIAL CELLS UA: NORMAL /HPF (ref 0–5)
GLUCOSE URINE: NEGATIVE
KETONES, URINE: NEGATIVE
LEUKOCYTE ESTERASE, URINE: NEGATIVE
Lab: NORMAL
Lab: NORMAL
MUCUS: NORMAL
NITRITE, URINE: NEGATIVE
OTHER OBSERVATIONS UA: NORMAL
PH UA: 5.5 (ref 5–6)
PROTEIN UA: NEGATIVE
RBC UA: NORMAL /HPF (ref 0–4)
RENAL EPITHELIAL, UA: NORMAL /HPF
SPECIFIC GRAVITY UA: 1.01 (ref 1.01–1.02)
SPECIMEN DESCRIPTION: NORMAL
SPECIMEN DESCRIPTION: NORMAL
STATUS: NORMAL
STATUS: NORMAL
TRICHOMONAS: NORMAL
TURBIDITY: NORMAL
URINE HGB: NEGATIVE
UROBILINOGEN, URINE: NORMAL
WBC UA: NORMAL /HPF (ref 0–4)
YEAST: NORMAL

## 2017-01-06 LAB
ABSOLUTE EOS #: 0.1 K/UL (ref 0–0.4)
ABSOLUTE LYMPH #: 1.6 K/UL (ref 1–4.8)
ABSOLUTE MONO #: 0.6 K/UL (ref 0.1–1.2)
BASOPHILS # BLD: 1 % (ref 0–2)
BASOPHILS ABSOLUTE: 0 K/UL (ref 0–0.2)
CULTURE: ABNORMAL
DIFFERENTIAL TYPE: ABNORMAL
EOSINOPHILS RELATIVE PERCENT: 1 % (ref 1–4)
HCT VFR BLD CALC: 30.2 % (ref 36–46)
HEMOGLOBIN: 9.8 G/DL (ref 12–16)
LYMPHOCYTES # BLD: 33 % (ref 24–44)
Lab: ABNORMAL
MCH RBC QN AUTO: 33.7 PG (ref 26–34)
MCHC RBC AUTO-ENTMCNC: 32.4 G/DL (ref 31–37)
MCV RBC AUTO: 103.8 FL (ref 80–100)
MONOCYTES # BLD: 12 % (ref 1–7)
PDW BLD-RTO: 14.6 % (ref 11–14.5)
PLATELET # BLD: 175 K/UL (ref 140–450)
PLATELET ESTIMATE: ABNORMAL
PMV BLD AUTO: 8.5 FL (ref 6–12)
RBC # BLD: 2.9 M/UL (ref 4–5.2)
RBC # BLD: ABNORMAL 10*6/UL
SEG NEUTROPHILS: 53 % (ref 36–66)
SEGMENTED NEUTROPHILS ABSOLUTE COUNT: 2.7 K/UL (ref 1.8–7.7)
SPECIMEN DESCRIPTION: ABNORMAL
STATUS: ABNORMAL
WBC # BLD: 5 K/UL (ref 3.5–11)
WBC # BLD: ABNORMAL 10*3/UL

## 2017-01-10 DIAGNOSIS — R10.11 RIGHT UPPER QUADRANT ABDOMINAL PAIN: Primary | ICD-10-CM

## 2017-01-10 LAB
CREAT SERPL-MCNC: 1.49 MG/DL (ref 0.5–0.9)
GFR AFRICAN AMERICAN: ABNORMAL ML/MIN
GFR NON-AFRICAN AMERICAN: ABNORMAL ML/MIN
GFR SERPL CREATININE-BSD FRML MDRD: ABNORMAL ML/MIN/{1.73_M2}
GFR SERPL CREATININE-BSD FRML MDRD: ABNORMAL ML/MIN/{1.73_M2}

## 2017-01-17 PROBLEM — F32.4 MAJOR DEPRESSIVE DISORDER WITH SINGLE EPISODE, IN PARTIAL REMISSION (HCC): Status: ACTIVE | Noted: 2017-01-17

## 2017-01-18 ENCOUNTER — OUTSIDE SERVICES (OUTPATIENT)
Dept: INTERNAL MEDICINE | Age: 82
End: 2017-01-18

## 2017-01-18 DIAGNOSIS — R53.1 WEAKNESS: Primary | ICD-10-CM

## 2017-01-18 DIAGNOSIS — J44.9 CHRONIC OBSTRUCTIVE PULMONARY DISEASE, UNSPECIFIED COPD TYPE (HCC): ICD-10-CM

## 2017-01-18 DIAGNOSIS — R26.9 GAIT ABNORMALITY: ICD-10-CM

## 2017-01-18 DIAGNOSIS — F32.4 MAJOR DEPRESSIVE DISORDER WITH SINGLE EPISODE, IN PARTIAL REMISSION (HCC): ICD-10-CM

## 2017-01-18 DIAGNOSIS — I25.10 CORONARY ARTERY DISEASE INVOLVING NATIVE CORONARY ARTERY OF NATIVE HEART WITHOUT ANGINA PECTORIS: ICD-10-CM

## 2017-01-18 DIAGNOSIS — I10 ESSENTIAL HYPERTENSION: ICD-10-CM

## 2017-01-18 DIAGNOSIS — I50.32 CHRONIC DIASTOLIC CONGESTIVE HEART FAILURE (HCC): ICD-10-CM

## 2017-01-18 DIAGNOSIS — E78.2 MIXED HYPERLIPIDEMIA: ICD-10-CM

## 2017-01-18 PROCEDURE — 99305 1ST NF CARE MODERATE MDM 35: CPT | Performed by: INTERNAL MEDICINE

## 2017-01-22 LAB
DIRECT EXAM: NORMAL
Lab: NORMAL
SPECIMEN DESCRIPTION: NORMAL
STATUS: NORMAL

## 2017-02-03 RX ORDER — GUAIFENESIN/DEXTROMETHORPHAN 100-10MG/5
10 SYRUP ORAL 4 TIMES DAILY PRN
COMMUNITY
End: 2017-01-01 | Stop reason: ALTCHOICE

## 2017-02-21 PROCEDURE — 99308 SBSQ NF CARE LOW MDM 20: CPT | Performed by: INTERNAL MEDICINE

## 2017-02-22 ENCOUNTER — OUTSIDE SERVICES (OUTPATIENT)
Dept: INTERNAL MEDICINE | Age: 82
End: 2017-02-22

## 2017-02-22 DIAGNOSIS — J44.9 CHRONIC OBSTRUCTIVE PULMONARY DISEASE, UNSPECIFIED COPD TYPE (HCC): ICD-10-CM

## 2017-02-22 DIAGNOSIS — I10 ESSENTIAL HYPERTENSION: ICD-10-CM

## 2017-02-22 DIAGNOSIS — E78.2 MIXED HYPERLIPIDEMIA: ICD-10-CM

## 2017-02-22 DIAGNOSIS — I50.32 CHRONIC DIASTOLIC CONGESTIVE HEART FAILURE (HCC): ICD-10-CM

## 2017-02-22 DIAGNOSIS — R53.1 WEAKNESS: Primary | ICD-10-CM

## 2017-02-22 DIAGNOSIS — F32.4 MAJOR DEPRESSIVE DISORDER WITH SINGLE EPISODE, IN PARTIAL REMISSION (HCC): ICD-10-CM

## 2017-02-22 DIAGNOSIS — R26.9 GAIT ABNORMALITY: ICD-10-CM

## 2017-02-22 DIAGNOSIS — I25.10 CORONARY ARTERY DISEASE INVOLVING NATIVE CORONARY ARTERY OF NATIVE HEART WITHOUT ANGINA PECTORIS: ICD-10-CM

## 2017-02-24 ENCOUNTER — CARE COORDINATION (OUTPATIENT)
Dept: CARE COORDINATION | Facility: CLINIC | Age: 82
End: 2017-02-24

## 2017-02-27 ENCOUNTER — CARE COORDINATION (OUTPATIENT)
Dept: CARE COORDINATION | Facility: CLINIC | Age: 82
End: 2017-02-27

## 2017-03-06 ENCOUNTER — OFFICE VISIT (OUTPATIENT)
Dept: FAMILY MEDICINE CLINIC | Age: 82
End: 2017-03-06

## 2017-03-06 VITALS
WEIGHT: 137 LBS | DIASTOLIC BLOOD PRESSURE: 60 MMHG | OXYGEN SATURATION: 98 % | BODY MASS INDEX: 23.39 KG/M2 | SYSTOLIC BLOOD PRESSURE: 102 MMHG | HEART RATE: 72 BPM | HEIGHT: 64 IN

## 2017-03-06 DIAGNOSIS — F41.9 ANXIETY: ICD-10-CM

## 2017-03-06 DIAGNOSIS — I10 ESSENTIAL HYPERTENSION: ICD-10-CM

## 2017-03-06 DIAGNOSIS — R26.9 GAIT ABNORMALITY: ICD-10-CM

## 2017-03-06 DIAGNOSIS — R53.1 WEAKNESS: Primary | ICD-10-CM

## 2017-03-06 PROCEDURE — G8598 ASA/ANTIPLAT THER USED: HCPCS | Performed by: FAMILY MEDICINE

## 2017-03-06 PROCEDURE — G8427 DOCREV CUR MEDS BY ELIG CLIN: HCPCS | Performed by: FAMILY MEDICINE

## 2017-03-06 PROCEDURE — 4040F PNEUMOC VAC/ADMIN/RCVD: CPT | Performed by: FAMILY MEDICINE

## 2017-03-06 PROCEDURE — G8420 CALC BMI NORM PARAMETERS: HCPCS | Performed by: FAMILY MEDICINE

## 2017-03-06 PROCEDURE — 99214 OFFICE O/P EST MOD 30 MIN: CPT | Performed by: FAMILY MEDICINE

## 2017-03-06 PROCEDURE — 1123F ACP DISCUSS/DSCN MKR DOCD: CPT | Performed by: FAMILY MEDICINE

## 2017-03-06 PROCEDURE — 1036F TOBACCO NON-USER: CPT | Performed by: FAMILY MEDICINE

## 2017-03-06 PROCEDURE — 1090F PRES/ABSN URINE INCON ASSESS: CPT | Performed by: FAMILY MEDICINE

## 2017-03-06 PROCEDURE — G8484 FLU IMMUNIZE NO ADMIN: HCPCS | Performed by: FAMILY MEDICINE

## 2017-03-06 RX ORDER — SERTRALINE HYDROCHLORIDE 25 MG/1
TABLET, FILM COATED ORAL
COMMUNITY
Start: 2016-12-30 | End: 2017-01-01

## 2017-03-06 RX ORDER — ASPIRIN 81 MG/1
81 TABLET ORAL DAILY
COMMUNITY

## 2017-03-06 RX ORDER — LORAZEPAM 0.5 MG/1
TABLET ORAL
COMMUNITY
Start: 2017-02-01 | End: 2017-03-24 | Stop reason: SDUPTHER

## 2017-03-19 ASSESSMENT — ENCOUNTER SYMPTOMS: CONSTIPATION: 1

## 2017-03-24 ENCOUNTER — CARE COORDINATION (OUTPATIENT)
Dept: CARE COORDINATION | Age: 82
End: 2017-03-24

## 2017-03-24 ENCOUNTER — TELEPHONE (OUTPATIENT)
Dept: FAMILY MEDICINE CLINIC | Age: 82
End: 2017-03-24
Payer: MEDICARE

## 2017-03-24 DIAGNOSIS — Z91.81 HISTORY OF FALL: ICD-10-CM

## 2017-03-24 DIAGNOSIS — I50.22 CHRONIC SYSTOLIC HEART FAILURE (HCC): Primary | ICD-10-CM

## 2017-03-24 DIAGNOSIS — F41.9 ANXIETY: Primary | ICD-10-CM

## 2017-03-24 DIAGNOSIS — Z95.0 CARDIAC PACEMAKER IN SITU: ICD-10-CM

## 2017-03-24 DIAGNOSIS — Z87.891 PERSONAL HISTORY OF TOBACCO USE, PRESENTING HAZARDS TO HEALTH: ICD-10-CM

## 2017-03-24 DIAGNOSIS — I25.10 ATHEROSCLEROSIS OF NATIVE CORONARY ARTERY OF NATIVE HEART WITHOUT ANGINA PECTORIS: ICD-10-CM

## 2017-03-24 DIAGNOSIS — H35.30 MACULAR DEGENERATION: ICD-10-CM

## 2017-03-24 DIAGNOSIS — J44.9 OBSTRUCTIVE CHRONIC BRONCHITIS WITHOUT EXACERBATION (HCC): ICD-10-CM

## 2017-03-24 DIAGNOSIS — N18.30 CHRONIC KIDNEY DISEASE, STAGE III (MODERATE) (HCC): ICD-10-CM

## 2017-03-24 DIAGNOSIS — Z79.82 LONG TERM (CURRENT) USE OF ASPIRIN: ICD-10-CM

## 2017-03-24 DIAGNOSIS — I13.0 HYPERTENSIVE HEART AND RENAL DISEASE WITH CONGESTIVE HEART FAILURE (HCC): ICD-10-CM

## 2017-03-24 DIAGNOSIS — Z99.81 DEPENDENCE ON SUPPLEMENTAL OXYGEN: ICD-10-CM

## 2017-03-24 DIAGNOSIS — I25.5 ISCHEMIC CARDIOMYOPATHY: ICD-10-CM

## 2017-03-24 PROCEDURE — G0180 MD CERTIFICATION HHA PATIENT: HCPCS | Performed by: FAMILY MEDICINE

## 2017-03-24 RX ORDER — BENZONATATE 100 MG/1
100 CAPSULE ORAL 3 TIMES DAILY PRN
COMMUNITY
End: 2017-01-01 | Stop reason: ALTCHOICE

## 2017-03-24 RX ORDER — POLYVINYL ALCOHOL 14 MG/ML
1 SOLUTION/ DROPS OPHTHALMIC 2 TIMES DAILY PRN
COMMUNITY

## 2017-03-24 RX ORDER — LORAZEPAM 0.5 MG/1
TABLET ORAL
Qty: 60 TABLET | Refills: 0 | Status: SHIPPED | OUTPATIENT
Start: 2017-03-24 | End: 2017-04-28 | Stop reason: SDUPTHER

## 2017-03-24 RX ORDER — ONDANSETRON 4 MG/1
4 TABLET, FILM COATED ORAL EVERY 8 HOURS PRN
COMMUNITY
End: 2017-01-01 | Stop reason: SDUPTHER

## 2017-03-24 RX ORDER — SIMETHICONE 80 MG
80 TABLET,CHEWABLE ORAL 3 TIMES DAILY PRN
COMMUNITY

## 2017-03-24 RX ORDER — LOPERAMIDE HYDROCHLORIDE 2 MG/1
2 CAPSULE ORAL 4 TIMES DAILY PRN
COMMUNITY
End: 2017-01-01 | Stop reason: ALTCHOICE

## 2017-03-24 RX ORDER — ALLOPURINOL 100 MG/1
TABLET ORAL
Qty: 180 TABLET | Refills: 1 | Status: SHIPPED | OUTPATIENT
Start: 2017-03-24 | End: 2017-01-01 | Stop reason: SDUPTHER

## 2017-03-28 ENCOUNTER — CARE COORDINATION (OUTPATIENT)
Dept: CARE COORDINATION | Age: 82
End: 2017-03-28

## 2017-03-28 DIAGNOSIS — Z91.81 PERSONAL HISTORY OF FALL: ICD-10-CM

## 2017-03-28 DIAGNOSIS — R26.9 GAIT ABNORMALITY: ICD-10-CM

## 2017-03-28 DIAGNOSIS — R29.898 WEAKNESS OF BOTH LOWER EXTREMITIES: Primary | ICD-10-CM

## 2017-03-28 DIAGNOSIS — M62.81 MUSCLE WEAKNESS (GENERALIZED): ICD-10-CM

## 2017-04-13 DIAGNOSIS — K21.9 GASTROESOPHAGEAL REFLUX DISEASE WITHOUT ESOPHAGITIS: ICD-10-CM

## 2017-04-13 RX ORDER — SPIRONOLACTONE 25 MG/1
25 TABLET ORAL DAILY
Qty: 90 TABLET | Refills: 1 | Status: CANCELLED | OUTPATIENT
Start: 2017-04-13

## 2017-04-13 RX ORDER — OMEPRAZOLE 20 MG/1
CAPSULE, DELAYED RELEASE ORAL
Qty: 180 CAPSULE | Refills: 1 | Status: CANCELLED | OUTPATIENT
Start: 2017-04-13

## 2017-04-14 RX ORDER — OMEPRAZOLE 20 MG/1
CAPSULE, DELAYED RELEASE ORAL
Qty: 180 CAPSULE | Refills: 1 | Status: SHIPPED | OUTPATIENT
Start: 2017-04-14 | End: 2017-01-01 | Stop reason: SDUPTHER

## 2017-04-14 RX ORDER — SPIRONOLACTONE 25 MG/1
25 TABLET ORAL DAILY
Qty: 90 TABLET | Refills: 1 | Status: SHIPPED | OUTPATIENT
Start: 2017-04-14 | End: 2017-01-01 | Stop reason: SDUPTHER

## 2017-04-27 ENCOUNTER — PROCEDURE VISIT (OUTPATIENT)
Dept: CARDIOLOGY | Age: 82
End: 2017-04-27
Payer: MEDICARE

## 2017-04-27 DIAGNOSIS — Z95.0 CARDIAC PACEMAKER IN SITU: Primary | ICD-10-CM

## 2017-04-27 DIAGNOSIS — I49.5 SSS (SICK SINUS SYNDROME) (HCC): ICD-10-CM

## 2017-04-27 PROCEDURE — 93288 INTERROG EVL PM/LDLS PM IP: CPT | Performed by: INTERNAL MEDICINE

## 2017-04-28 DIAGNOSIS — F41.9 ANXIETY: ICD-10-CM

## 2017-04-28 DIAGNOSIS — I50.9 CONGESTIVE HEART FAILURE, UNSPECIFIED CONGESTIVE HEART FAILURE CHRONICITY, UNSPECIFIED CONGESTIVE HEART FAILURE TYPE: ICD-10-CM

## 2017-04-28 RX ORDER — BUMETANIDE 1 MG/1
1 TABLET ORAL 2 TIMES DAILY
Qty: 60 TABLET | Refills: 5 | Status: SHIPPED | OUTPATIENT
Start: 2017-04-28 | End: 2017-01-01 | Stop reason: SDUPTHER

## 2017-05-02 RX ORDER — LORAZEPAM 0.5 MG/1
TABLET ORAL
Qty: 60 TABLET | Refills: 0 | Status: SHIPPED | OUTPATIENT
Start: 2017-05-02 | End: 2017-05-15 | Stop reason: SDUPTHER

## 2017-05-12 ENCOUNTER — HOSPITAL ENCOUNTER (EMERGENCY)
Age: 82
Discharge: HOME | End: 2017-05-12
Attending: EMERGENCY MEDICINE
Payer: MEDICARE

## 2017-05-12 VITALS
OXYGEN SATURATION: 97 % | DIASTOLIC BLOOD PRESSURE: 60 MMHG | HEART RATE: 60 BPM | RESPIRATION RATE: 14 BRPM | SYSTOLIC BLOOD PRESSURE: 124 MMHG | TEMPERATURE: 97.7 F

## 2017-05-12 DIAGNOSIS — E86.0 DEHYDRATION: Primary | ICD-10-CM

## 2017-05-12 LAB
-: NORMAL
ABSOLUTE EOS #: 0.3 K/UL (ref 0–0.4)
ABSOLUTE LYMPH #: 1.3 K/UL (ref 1–4.8)
ABSOLUTE MONO #: 0.6 K/UL (ref 0.1–1.2)
AMORPHOUS: NORMAL
ANION GAP SERPL CALCULATED.3IONS-SCNC: 11 MMOL/L (ref 9–17)
BACTERIA: NORMAL
BASOPHILS # BLD: 1 %
BASOPHILS ABSOLUTE: 0.1 K/UL (ref 0–0.2)
BILIRUBIN URINE: NEGATIVE
BUN BLDV-MCNC: 30 MG/DL (ref 8–23)
BUN/CREAT BLD: 28 (ref 9–20)
CALCIUM SERPL-MCNC: 8.7 MG/DL (ref 8.6–10.4)
CASTS UA: NORMAL /LPF (ref 0–2)
CHLORIDE BLD-SCNC: 92 MMOL/L (ref 98–107)
CO2: 28 MMOL/L (ref 20–31)
COLOR: ABNORMAL
COMMENT UA: ABNORMAL
CREAT SERPL-MCNC: 1.06 MG/DL (ref 0.5–0.9)
CRYSTALS, UA: NORMAL /HPF
DIFFERENTIAL TYPE: ABNORMAL
EOSINOPHILS RELATIVE PERCENT: 6 %
EPITHELIAL CELLS UA: NORMAL /HPF (ref 0–5)
GFR AFRICAN AMERICAN: 58 ML/MIN
GFR NON-AFRICAN AMERICAN: 48 ML/MIN
GFR SERPL CREATININE-BSD FRML MDRD: ABNORMAL ML/MIN/{1.73_M2}
GFR SERPL CREATININE-BSD FRML MDRD: ABNORMAL ML/MIN/{1.73_M2}
GLUCOSE BLD-MCNC: 114 MG/DL (ref 70–99)
GLUCOSE URINE: NEGATIVE
HCT VFR BLD CALC: 29.3 % (ref 36–46)
HEMOGLOBIN: 9.5 G/DL (ref 12–16)
KETONES, URINE: NEGATIVE
LEUKOCYTE ESTERASE, URINE: NEGATIVE
LYMPHOCYTES # BLD: 25 %
MCH RBC QN AUTO: 34 PG (ref 26–34)
MCHC RBC AUTO-ENTMCNC: 32.6 G/DL (ref 31–37)
MCV RBC AUTO: 104.5 FL (ref 80–100)
MONOCYTES # BLD: 12 %
MUCUS: NORMAL
NITRITE, URINE: NEGATIVE
OTHER OBSERVATIONS UA: NORMAL
PDW BLD-RTO: 14.8 % (ref 11–14.5)
PH UA: 5.5 (ref 5–6)
PLATELET # BLD: 212 K/UL (ref 140–450)
PLATELET ESTIMATE: ABNORMAL
PMV BLD AUTO: 7.5 FL (ref 6–12)
POTASSIUM SERPL-SCNC: 4.4 MMOL/L (ref 3.7–5.3)
PROTEIN UA: NEGATIVE
RBC # BLD: 2.8 M/UL (ref 4–5.2)
RBC # BLD: ABNORMAL 10*6/UL
RBC UA: NORMAL /HPF (ref 0–4)
RENAL EPITHELIAL, UA: NORMAL /HPF
SEG NEUTROPHILS: 56 %
SEGMENTED NEUTROPHILS ABSOLUTE COUNT: 2.8 K/UL (ref 1.8–7.7)
SODIUM BLD-SCNC: 131 MMOL/L (ref 135–144)
SPECIFIC GRAVITY UA: 1 (ref 1.01–1.02)
TRICHOMONAS: NORMAL
TURBIDITY: ABNORMAL
URINE HGB: NEGATIVE
UROBILINOGEN, URINE: NORMAL
WBC # BLD: 5.1 K/UL (ref 3.5–11)
WBC # BLD: ABNORMAL 10*3/UL
WBC UA: NORMAL /HPF (ref 0–4)
YEAST: NORMAL

## 2017-05-12 PROCEDURE — 96360 HYDRATION IV INFUSION INIT: CPT

## 2017-05-12 PROCEDURE — 2580000003 HC RX 258: Performed by: EMERGENCY MEDICINE

## 2017-05-12 PROCEDURE — 85025 COMPLETE CBC W/AUTO DIFF WBC: CPT

## 2017-05-12 PROCEDURE — 36415 COLL VENOUS BLD VENIPUNCTURE: CPT

## 2017-05-12 PROCEDURE — 80048 BASIC METABOLIC PNL TOTAL CA: CPT

## 2017-05-12 PROCEDURE — 99283 EMERGENCY DEPT VISIT LOW MDM: CPT

## 2017-05-12 PROCEDURE — 81001 URINALYSIS AUTO W/SCOPE: CPT

## 2017-05-12 RX ORDER — 0.9 % SODIUM CHLORIDE 0.9 %
250 INTRAVENOUS SOLUTION INTRAVENOUS ONCE
Status: COMPLETED | OUTPATIENT
Start: 2017-05-12 | End: 2017-05-12

## 2017-05-12 RX ADMIN — SODIUM CHLORIDE 250 ML: 9 INJECTION, SOLUTION INTRAVENOUS at 17:01

## 2017-05-15 ENCOUNTER — TELEPHONE (OUTPATIENT)
Dept: INTERNAL MEDICINE | Age: 82
End: 2017-05-15

## 2017-05-15 ENCOUNTER — CARE COORDINATION (OUTPATIENT)
Dept: CARE COORDINATION | Age: 82
End: 2017-05-15

## 2017-05-15 ENCOUNTER — TELEPHONE (OUTPATIENT)
Dept: FAMILY MEDICINE CLINIC | Age: 82
End: 2017-05-15

## 2017-05-15 DIAGNOSIS — F41.9 ANXIETY: ICD-10-CM

## 2017-05-15 RX ORDER — LORAZEPAM 0.5 MG/1
TABLET ORAL
Qty: 60 TABLET | Refills: 0 | Status: SHIPPED | OUTPATIENT
Start: 2017-05-15 | End: 2017-01-01 | Stop reason: SDUPTHER

## 2017-10-05 NOTE — TELEPHONE ENCOUNTER
(FYI note). Pat returned call with update. Visual hallucinations have diminished some. Claus Coryell, she had none. Mon, they had to inform her that her 79 yo sister passed away & about 10-15 min after that, she did have some more hallucinations, but didn't last long. Tues, she had 1. Yesterday, none. Agrees that it seems to be related to stress. There are 2 siblings left, Jose Vines who is the oldest, & Maryellen Myles, the youngest, who is 80.

## 2017-10-05 NOTE — TELEPHONE ENCOUNTER
Noted - continue to hold afternoon Ativan dose unless pt asks for it or seems anxious/distressed. Family to call if symptoms worsen.

## 2017-10-23 NOTE — PROGRESS NOTES
 UPPER GASTROINTESTINAL ENDOSCOPY  07/27/2012    EGD.  YAG CAPSULOTOMY Right 10/21/2014    Dr Neno Lentz     Family History   Problem Relation Age of Onset    Diabetes Son     Diabetes Son     Diabetes Son     Stroke Son     Diabetes Sister      Social History   Substance Use Topics    Smoking status: Former Smoker     Packs/day: 0.25     Years: 20.00    Smokeless tobacco: Never Used      Salma Rollins, RRT 10/17/16    Alcohol use No      Current Outpatient Prescriptions   Medication Sig Dispense Refill    omeprazole (PRILOSEC) 20 MG delayed release capsule take 1 capsule by mouth twice a day 180 capsule 1    hydrocortisone (PROCTOSOL HC) 2.5 % rectal cream Place rectally 2 times daily Place rectally 2 times daily. 1 Tube 2    bumetanide (BUMEX) 1 MG tablet take 1 tablet by mouth twice a day 90 tablet 1    spironolactone (ALDACTONE) 25 MG tablet take 1 tablet by mouth once daily 90 tablet 1    LORazepam (ATIVAN) 0.5 MG tablet Take 1/2 tab during the day and 1 tab at night. May take extra 1/2 tab during the day prn anxiety. 60 tablet 0    allopurinol (ZYLOPRIM) 100 MG tablet Take 2 tablets by mouth daily 180 tablet 3    polyethylene glycol (GLYCOLAX) powder Take 17 g by mouth daily 510 g 3    Compression Stockings MISC by Does not apply route Dispense 2 pairs, requesting zippered kind. 15-20 mm Hg. 2 each 0    ondansetron (ZOFRAN) 4 MG tablet Take 1 tablet by mouth every 8 hours as needed for Nausea or Vomiting From [facility] care plan.  40 tablet 2    meclizine (ANTIVERT) 25 MG tablet TAKE ONE TABLET BY MOUTH 3 TIMES A DAY AS NEEDED 40 tablet 2    simvastatin (ZOCOR) 10 MG tablet take 1 tablet by mouth every evening 90 tablet 3    lisinopril (PRINIVIL;ZESTRIL) 5 MG tablet Take 1 tablet by mouth daily Patient to take if BP is over 150/90. 30 tablet 1    fluticasone (FLONASE) 50 MCG/ACT nasal spray 1 spray by Nasal route daily      Nebulizers (COMPRESSOR/NEBULIZER) MISC 1 Device by

## 2017-10-30 NOTE — TELEPHONE ENCOUNTER
Would you  be able to set up Home Physical Therapy with [facility].  Was doing good up until 10/27/17. [phone]

## 2017-11-06 NOTE — CARE COORDINATION
Date Taking? Authorizing Provider   magnesium gluconate (MAGONATE) 500 MG tablet Take 250 mg by mouth 2 times daily    Historical Provider, MD   omeprazole (PRILOSEC) 20 MG delayed release capsule take 1 capsule by mouth twice a day 10/23/17   Ash Paul, DO   hydrocortisone (PROCTOSOL HC) 2.5 % rectal cream Place rectally 2 times daily Place rectally 2 times daily. 10/23/17   Ash Paul, DO   bumetanide (BUMEX) 1 MG tablet take 1 tablet by mouth twice a day 10/16/17   Jose Olson, DO   spironolactone (ALDACTONE) 25 MG tablet take 1 tablet by mouth once daily 10/16/17   Ash Paul, DO   LORazepam (ATIVAN) 0.5 MG tablet Take 1/2 tab during the day and 1 tab at night. May take extra 1/2 tab during the day prn anxiety. 9/28/17   Jose Olson, DO   allopurinol (ZYLOPRIM) 100 MG tablet Take 2 tablets by mouth daily 9/21/17   Ash Paul, DO   polyethylene glycol [facility]) powder Take 17 g by mouth daily 9/21/17   Jose Olson, DO   Compression Stockings MISC by Does not apply route Dispense 2 pairs, requesting zippered kind. 15-20 mm Hg. 8/28/17   Ash Paul, DO   ondansetron (ZOFRAN) 4 MG tablet Take 1 tablet by mouth every 8 hours as needed for Nausea or Vomiting From [facility] care plan.  8/28/17   Ash Paul, DO   meclizine (ANTIVERT) 25 MG tablet TAKE ONE TABLET BY MOUTH 3 TIMES A DAY AS NEEDED 8/28/17   Ruthanne Sherri Paul, DO   simvastatin (ZOCOR) 10 MG tablet take 1 tablet by mouth every evening 8/16/17   Ash Paul, DO   lisinopril (PRINIVIL;ZESTRIL) 5 MG tablet Take 1 tablet by mouth daily Patient to take if BP is over 150/90. 7/10/17   Ash Paul, DO   fluticasone (FLONASE) 50 MCG/ACT nasal spray 1 spray by Nasal route daily    Historical Provider, MD   Nebulizers (COMPRESSOR/NEBULIZER) MISC 1 Device by Does not apply route daily as needed (cough/shortness of breath) Indications: Treatment to Prevent COPD with Bronchospasms 6/7/17   Ash Paul,    albuterol (PROVENTIL) (2.5 MG/3ML) 0.083% nebulizer solution Take 3 mLs by nebulization every 6 hours as needed for Wheezing or Shortness of Breath 6/7/17   Yolis Narayan DO   Respiratory Therapy Supplies (NEBULIZER/ADULT MASK) KIT Use with nebulizer machine as directed. 6/7/17   Yolis Narayan DO   Lift Chair [id number] [facility] by Does not apply route 3/29/17   Jewel Paul DO   simethicone (MYLICON) 80 MG chewable tablet Take 80 mg by mouth 3 times daily as needed for Flatulence From [facility] cre plan. Historical Provider, MD   polyvinyl alcohol (ARTIFICIAL TEARS) 1.4 % ophthalmic solution Place 1 drop into both eyes 2 times daily as needed for Dry Eyes From [facility] care plan. Historical Provider, MD   aspirin 81 MG EC tablet Take 81 mg by mouth daily    Historical Provider, MD   loratadine (CLARITIN) 10 MG tablet take 1 tablet by mouth once daily 11/16/16   Jewel Paul DO   docusate sodium (COLACE) 100 MG capsule Take 100 mg by mouth 2 times daily as needed for Constipation    Historical Provider, MD   ferrous sulfate 325 (65 FE) MG tablet Take 325 mg by mouth 3 times daily (with meals)     Historical Provider, MD   acetaminophen (TYLENOL) 325 MG tablet Take 650 mg by mouth every 6 hours as needed for Pain    Historical Provider, MD   OXYGEN Inhale 2 L into the lungs nightly. Historical Provider, MD   Multiple Vitamins-Minerals (CENTRUM SILVER PO) Take 1 tablet by mouth daily.     Historical Provider, MD       Future Appointments  Date Time Provider Cathy Soo   11/8/2017 1:30 PM Valeria Carrillo DPM DPOD DP   11/30/2017 10:15 AM SCHEDULE, PACEMAKER [facility] CARDIOLOGY RD [facility]   11/30/2017 10:45 AM MD RD Lopez [facility]   1/23/2018 3:20 PM [facility]

## 2017-11-08 NOTE — PROGRESS NOTES
Subjective:  Patient presents to [facility] today for routine foot care. Patient denies any new problems with their feet. Allergies   Allergen Reactions    Sulfa Antibiotics Hives     Other reaction(s): Unknown       Past Medical History:   Diagnosis Date    Age-related macular degeneration, dry     Arrhythmia     status post pacemaker for symptomatic sinus bradycardia.  Atrial fibrillation (HCC)     Cataract, left eye     advancing    Chronic obstructive pulmonary disease (HCC)     Congestive heart failure (HCC)     Coronary artery disease     status post anteroapical MI in 2008 with troponin elevation to 150 treated medically with no cardiac cath done, EF of 35 to 40% with prior apical thrombus and ischemic cardiomyopathy.  Diverticulitis     Gastrointestinal bleed     GERD (gastroesophageal reflux disease)     History of blood transfusion     History of recurrent UTI (urinary tract infection)     Hyperlipidemia     Hypertension     Ischemic cardiomyopathy     Myocardial infarction     Nonsustained ventricular tachycardia (HCC)     Pacemaker     Posterior vitreous detachment of left eye     Pseudophakia of right eye     Renal insufficiency     Sick sinus syndrome ([facility] [address]     history of        Prior to Admission medications    Medication Sig Start Date End Date Taking? Authorizing Provider   LORazepam (ATIVAN) 0.5 MG tablet Take 1/2 tab during the day and 1 tab at night. May take extra 1/2 tab during the day prn anxiety. 11/6/17  Yes Philemon Smoke Black, DO   magnesium gluconate (MAGONATE) 500 MG tablet Take 250 mg by mouth 2 times daily   Yes Historical Provider, MD   omeprazole (PRILOSEC) 20 MG delayed release capsule take 1 capsule by mouth twice a day 10/23/17  Yes Philemon Smoke Black, DO   hydrocortisone (PROCTOSOL HC) 2.5 % rectal cream Place rectally 2 times daily Place rectally 2 times daily.  10/23/17  Yes Mark Smoke Black, DO   bumetanide (BUMEX) 1 MG tablet take 1 tablet by mouth twice a day 10/16/17  Yes Rene Paul DO   spironolactone (ALDACTONE) 25 MG tablet take 1 tablet by mouth once daily 10/16/17  Yes Rene Paul DO   allopurinol (ZYLOPRIM) 100 MG tablet Take 2 tablets by mouth daily 9/21/17  Yes Rene Paul DO   polyethylene glycol (GLYCOLAX) powder Take 17 g by mouth daily 9/21/17  Yes Rene Paul DO   Compression Stockings MISC by Does not apply route Dispense 2 pairs, requesting zippered kind. 15-20 mm Hg. 8/28/17  Yes Rene Paul,    ondansetron (ZOFRAN) 4 MG tablet Take 1 tablet by mouth every 8 hours as needed for Nausea or Vomiting From [facility] care plan. 8/28/17  Yes Rene Paul DO   meclizine (ANTIVERT) 25 MG tablet TAKE ONE TABLET BY MOUTH 3 TIMES A DAY AS NEEDED 8/28/17  Yes Rene Paul DO   simvastatin (ZOCOR) 10 MG tablet take 1 tablet by mouth every evening 8/16/17  Yes Rene Paul DO   lisinopril (PRINIVIL;ZESTRIL) 5 MG tablet Take 1 tablet by mouth daily Patient to take if BP is over 150/90. 7/10/17  Yes Rene Paul DO   fluticasone (FLONASE) 50 MCG/ACT nasal spray 1 spray by Nasal route daily   Yes Historical Provider, MD   Nebulizers (COMPRESSOR/NEBULIZER) MISC 1 Device by Does not apply route daily as needed (cough/shortness of breath) Indications: Treatment to Prevent COPD with Bronchospasms 6/7/17  Yes Rene Paul DO   albuterol (PROVENTIL) (2.5 MG/3ML) 0.083% nebulizer solution Take 3 mLs by nebulization every 6 hours as needed for Wheezing or Shortness of Breath 6/7/17  Yes Rene Paul DO   Respiratory Therapy Supplies (NEBULIZER/ADULT MASK) KIT Use with nebulizer machine as directed. 6/7/17  Yes Xin Phan,    Lift Chair [id number] [facility] by Does not apply route 3/29/17  Yes Rene Paul    simethicone (MYLICON) 80 MG chewable tablet Take 80 mg by mouth 3 times daily as needed for Flatulence From [facility] cre plan.    Yes Historical Provider, MD   polyvinyl alcohol (ARTIFICIAL TEARS) 1.4 % ophthalmic solution Place 1 drop into absent upon palpation bilateral. Normal medial longitudinal arch, bilateral.  Ankle ROM decreased,bilateral.  1st MPJ ROM within normal limits, bilateral.  Dorsally contracted digits present. No other foot deformities. Integument:  Nails left 1, 2, 3, 4, 5 and right 1, 2, 3, 4, 5 thickened, dystrophic and crumbly, discolored with subungual debris. Hyperkeratotic tissue is absent. Skin color, texture, turgor normal. No rashes or lesions. Assessment:  1. PVD (peripheral vascular disease) (HCC)  OR DEBRIDEMENT OF NAILS, 6 OR MORE   2. Dermatophytosis of nail  OR DEBRIDEMENT OF NAILS, 6 OR MORE       Plan:  Nails as mentioned above debrided in length and thickness. Patient advised OTC methods for treatment of the mycotic nails. Patient will follow up in 10 weeks.

## 2017-11-19 NOTE — ED PROVIDER NOTES
[address] [facility] [place]  Phone: [phone]        Pt Name: Renetta Crespo  MRN: [id number]  [place] 10/23/1915  Date of evaluation: 11/19/17      CHIEF COMPLAINT       Chief Complaint   Patient presents with    Buttocks Pain         HISTORY OF PRESENT ILLNESS  (Location/Symptom, Timing/Onset, Context/Setting, Quality, Duration, Modifying Factors, Severity.)    Renetta Crespo is a 80 y.o. female who presents With low back pain. The patient states that she believes she was trying to get up from a toilet and may have twisted and developed some low back pain. The patient has chronic sciatica. She states this may have exacerbated her sciatica. She denies any abdominal pain, no fever no chills no numbness no weakness no blood in the stool. The patient states with her very low back pain. It is worse with palpation and certain movements. Otherwise, nothing else makes her symptoms better or worse       REVIEW OF SYSTEMS    (2-9 systems for level 4, 10 or more for level 5)     Review of Systems   Gastrointestinal: Negative for abdominal pain, blood in stool and rectal pain. Genitourinary: Negative for dysuria and frequency. Musculoskeletal: Positive for back pain. Skin: Negative. Neurological: Negative for weakness. PAST MEDICAL HISTORY    has a past medical history of Age-related macular degeneration, dry; Arrhythmia; Atrial fibrillation ([address] Cataract, left eye; Chronic obstructive pulmonary disease ([address] Congestive heart failure ([address] Coronary artery disease; Diverticulitis; Gastrointestinal bleed; GERD (gastroesophageal reflux disease); History of blood transfusion; History of recurrent UTI (urinary tract infection); Hyperlipidemia; Hypertension; Ischemic cardiomyopathy; Myocardial infarction; Nonsustained ventricular tachycardia ([address]  Pacemaker; Posterior vitreous detachment of left eye; Pseudophakia of right eye; Renal insufficiency; and Sick sinus syndrome ([facility] [address] SURGICAL HISTORY      has a past surgical history that includes Upper gastrointestinal endoscopy (07/27/2012); Appendectomy; Cardiac pacemaker placement (06/26/2012); Carpal tunnel release; Yag capsulotomy (Right, 10/21/2014); Cataract removal with implant (Left, 11/18/2014); Cholecystectomy; Hemorrhoid surgery; thoracentesis; Colonoscopy (08/22/2012); Dilation and curettage of uterus (1993); and pacemaker placement. CURRENT MEDICATIONS       Previous Medications    ACETAMINOPHEN (TYLENOL) 325 MG TABLET    Take 650 mg by mouth every 6 hours as needed for Pain    ALBUTEROL (PROVENTIL) (2.5 MG/3ML) 0.083% NEBULIZER SOLUTION    Take 3 mLs by nebulization every 6 hours as needed for Wheezing or Shortness of Breath    ALLOPURINOL (ZYLOPRIM) 100 MG TABLET    Take 2 tablets by mouth daily    ASPIRIN 81 MG EC TABLET    Take 81 mg by mouth daily    BUMETANIDE (BUMEX) 1 MG TABLET    take 1 tablet by mouth twice a day    COMPRESSION STOCKINGS MISC    by Does not apply route Dispense 2 pairs, requesting zippered kind. 15-20 mm Hg. DOCUSATE SODIUM (COLACE) 100 MG CAPSULE    Take 100 mg by mouth 2 times daily as needed for Constipation    FERROUS SULFATE 325 (65 FE) MG TABLET    Take 325 mg by mouth 3 times daily (with meals)     FLUTICASONE (FLONASE) 50 MCG/ACT NASAL SPRAY    1 spray by Nasal route daily    HYDROCORTISONE (PROCTOSOL HC) 2.5 % RECTAL CREAM    Place rectally 2 times daily Place rectally 2 times daily. LIFT CHAIR MISC    by Does not apply route    LISINOPRIL (PRINIVIL;ZESTRIL) 5 MG TABLET    Take 1 tablet by mouth daily Patient to take if BP is over 150/90. LORATADINE (CLARITIN) 10 MG TABLET    take 1 tablet by mouth once daily    LORAZEPAM (ATIVAN) 0.5 MG TABLET    Take 1/2 tab during the day and 1 tab at night. May take extra 1/2 tab during the day prn anxiety.     MAGNESIUM GLUCONATE (MAGONATE) 500 MG TABLET    Take 250 mg by mouth 2 times daily MECLIZINE (ANTIVERT) 25 MG TABLET    TAKE ONE TABLET BY MOUTH 3 TIMES A DAY AS NEEDED    MULTIPLE VITAMINS-MINERALS (CENTRUM SILVER PO)    Take 1 tablet by mouth daily. NEBULIZERS (COMPRESSOR/NEBULIZER) MISC    1 Device by Does not apply route daily as needed (cough/shortness of breath) Indications: Treatment to Prevent COPD with Bronchospasms    OMEPRAZOLE (PRILOSEC) 20 MG DELAYED RELEASE CAPSULE    take 1 capsule by mouth twice a day    ONDANSETRON (ZOFRAN) 4 MG TABLET    Take 1 tablet by mouth every 8 hours as needed for Nausea or Vomiting From [facility] care plan. OXYGEN    Inhale 2 L into the lungs nightly. POLYETHYLENE GLYCOL (GLYCOLAX) POWDER    Take 17 g by mouth daily    POLYVINYL ALCOHOL (ARTIFICIAL TEARS) 1.4 % OPHTHALMIC SOLUTION    Place 1 drop into both eyes 2 times daily as needed for Dry Eyes From [facility] care plan. RESPIRATORY THERAPY SUPPLIES (NEBULIZER/ADULT MASK) KIT    Use with nebulizer machine as directed. SIMETHICONE (MYLICON) 80 MG CHEWABLE TABLET    Take 80 mg by mouth 3 times daily as needed for Flatulence From [facility] cre plan. SIMVASTATIN (ZOCOR) 10 MG TABLET    take 1 tablet by mouth every evening    SPIRONOLACTONE (ALDACTONE) 25 MG TABLET    take 1 tablet by mouth once daily       ALLERGIES     is allergic to sulfa antibiotics. FAMILY HISTORY     indicated that her mother is . She indicated that her father is . She indicated that the status of her sister is unknown. She indicated that her maternal grandmother is . She indicated that her maternal grandfather is . She indicated that her paternal grandmother is . She indicated that her paternal grandfather is . She indicated that two of her three sons are alive. family history includes Diabetes in her sister, son, son, and son; Stroke in her son. SOCIAL HISTORY      reports that she has quit smoking. She has a 5.00 pack-year smoking history.  She has never disposition diagnosis with the patient and or their family/guardian. I have answered their questions and given discharge instructions. They voiced understanding of these instructions and did not have any further questions or complaints. PROCEDURES:  None    FINAL IMPRESSION      1. Acute low back pain with sciatica, sciatica laterality unspecified, unspecified back pain laterality          DISPOSITION/PLAN   DISPOSITION Discharge - Pending Orders Complete    CONDITION ON DISPOSITION:   Stable    PATIENT REFERRED TO:  Jose Olson DO  [address] [address]  [phone]    Call in 1 day        DISCHARGE MEDICATIONS:  New Prescriptions    No medications on file       (Please note that portions of this note were completed with a voice recognition program.  Efforts were made to edit the dictations but occasionally words are mis-transcribed.)    Anthony MD, F.A.C.E.P.   Attending Emergency Medicine Physician       Jose Ko MD  11/19/17 [id number]

## 2017-11-19 NOTE — ED TRIAGE NOTES
Pt to Er rm 6 via wheelchair from private auto. Pt alert and oriented. Pt states that she is having pain to R leg which is chronic in nature, but pt also c/o pain to buttock. Pt states it started while riding in car of way here. When pt points to the area is bony area without skin breakdown. pt reports her leg only hurts when she is trying to walk. Has a hx of sciatica.

## 2017-11-20 NOTE — CARE COORDINATION
Patient was called to follow up with most recent ER visit. There was no answer. A message was left on voicemail to have patient call back regarding ER visit. Office number given [phone].

## 2017-11-22 NOTE — CARE COORDINATION
Historical Provider, MD   omeprazole (PRILOSEC) 20 MG delayed release capsule take 1 capsule by mouth twice a day 10/23/17   Lissa Paul, DO   hydrocortisone (PROCTOSOL HC) 2.5 % rectal cream Place rectally 2 times daily Place rectally 2 times daily. 10/23/17   Lissa Paul, DO   bumetanide (BUMEX) 1 MG tablet take 1 tablet by mouth twice a day 10/16/17   Lissa Paul, DO   spironolactone (ALDACTONE) 25 MG tablet take 1 tablet by mouth once daily 10/16/17   Lissa Paul, DO   allopurinol (ZYLOPRIM) 100 MG tablet Take 2 tablets by mouth daily 9/21/17   Lissa Paul, DO   polyethylene glycol [facility]) powder Take 17 g by mouth daily 9/21/17   Larna Poles, DO   Compression Stockings MISC by Does not apply route Dispense 2 pairs, requesting zippered kind. 15-20 mm Hg. 8/28/17   Lissa Paul, DO   ondansetron (ZOFRAN) 4 MG tablet Take 1 tablet by mouth every 8 hours as needed for Nausea or Vomiting From [facility] care plan. 8/28/17   Lissa Paul, DO   meclizine (ANTIVERT) 25 MG tablet TAKE ONE TABLET BY MOUTH 3 TIMES A DAY AS NEEDED 8/28/17   Lissa Paul, DO   simvastatin (ZOCOR) 10 MG tablet take 1 tablet by mouth every evening 8/16/17   Lissa Paul, DO   lisinopril (PRINIVIL;ZESTRIL) 5 MG tablet Take 1 tablet by mouth daily Patient to take if BP is over 150/90. 7/10/17   Lissa Paul, DO   fluticasone (FLONASE) 50 MCG/ACT nasal spray 1 spray by Nasal route daily    Historical Provider, MD   Nebulizers (COMPRESSOR/NEBULIZER) MISC 1 Device by Does not apply route daily as needed (cough/shortness of breath) Indications: Treatment to Prevent COPD with Bronchospasms 6/7/17   Lissa Paul, DO   albuterol (PROVENTIL) (2.5 MG/3ML) 0.083% nebulizer solution Take 3 mLs by nebulization every 6 hours as needed for Wheezing or Shortness of Breath 6/7/17   Lisbeth Vela, DO   Respiratory Therapy Supplies (NEBULIZER/ADULT MASK) KIT Use with nebulizer machine as directed.  6/7/17   Lisbeth Vela, DO   Lift Chair

## 2017-11-24 NOTE — ED PROVIDER NOTES
[facility] ED  [address] [facility] [address] [place]  Phone: [phone]    Pt Name: Darron Woods  MRN: [id number]  [place] 10/23/1915  Date of evaluation: 11/24/2017      CHIEF COMPLAINT       Chief Complaint   Patient presents with    Diarrhea    Fatigue         HISTORY OF PRESENT ILLNESS     Darron Woods is a 80 y.o. female who presents Diarrhea and decreased appetite with generalized weakness in the past 3-4 days. She has diarrhea whenever she eats it she's been refusing to eat recently. Her vital signs are normal upon admission with exception of a 993 systolic blood pressure. No apparent sepsis. No recent antibiotic. No GI bleed. Here recently and given morphine for sciatica and was well for a day or 2 and that has returned also. She had steroids in the past and that worked well for her. No history of C. diff in the past.  There is no abdominal pain currently. Previous appendectomy and cholecystectomy. Multiple chronic illnesses. Brought in by her caregiver. REVIEW OF SYSTEMS         REVIEW OF SYSTEMS    Constitutional:  Denies fever, chills, or weakness   Eyes:  Denies vision changes  HEENT:  Denies sore throat or neck pain   Respiratory:  Denies cough or shortness of breath   Cardiovascular:  Denies chest pain  GI: As above   Musculoskeletal: Complaint of right-sided sciatica   Skin:  No rash on exposed surfaces   Neurologic:  Normal baseline mentation. No new deficits. Lymphatic:   No nodes or infection  Psychiatric:  No psychosis. Alert and interacting normally. Other ROS negative except as noted above. PAST MEDICAL HISTORY    has a past medical history of Age-related macular degeneration, dry; Arrhythmia; Atrial fibrillation ([address] Cataract, left eye; Chronic obstructive pulmonary disease ([address] Congestive heart failure ([address] Coronary artery disease; Diverticulitis; Gastrointestinal bleed; GERD (gastroesophageal reflux disease);  History of blood TABLET    take 1 tablet by mouth once daily    LORAZEPAM (ATIVAN) 0.5 MG TABLET    Take 1/2 tab during the day and 1 tab at night. May take extra 1/2 tab during the day prn anxiety. MAGNESIUM GLUCONATE (MAGONATE) 500 MG TABLET    Take 250 mg by mouth 2 times daily    MECLIZINE (ANTIVERT) 25 MG TABLET    TAKE ONE TABLET BY MOUTH 3 TIMES A DAY AS NEEDED    MULTIPLE VITAMINS-MINERALS (CENTRUM SILVER PO)    Take 1 tablet by mouth daily. NEBULIZERS (COMPRESSOR/NEBULIZER) MISC    1 Device by Does not apply route daily as needed (cough/shortness of breath) Indications: Treatment to Prevent COPD with Bronchospasms    OMEPRAZOLE (PRILOSEC) 20 MG DELAYED RELEASE CAPSULE    take 1 capsule by mouth twice a day    ONDANSETRON (ZOFRAN) 4 MG TABLET    Take 1 tablet by mouth every 8 hours as needed for Nausea or Vomiting From [facility] care plan. OXYGEN    Inhale 2 L into the lungs nightly. POLYETHYLENE GLYCOL (GLYCOLAX) POWDER    Take 17 g by mouth daily    POLYVINYL ALCOHOL (ARTIFICIAL TEARS) 1.4 % OPHTHALMIC SOLUTION    Place 1 drop into both eyes 2 times daily as needed for Dry Eyes From [facility] care plan. RESPIRATORY THERAPY SUPPLIES (NEBULIZER/ADULT MASK) KIT    Use with nebulizer machine as directed. SIMETHICONE (MYLICON) 80 MG CHEWABLE TABLET    Take 80 mg by mouth 3 times daily as needed for Flatulence From [facility] cre plan. SIMVASTATIN (ZOCOR) 10 MG TABLET    take 1 tablet by mouth every evening    SPIRONOLACTONE (ALDACTONE) 25 MG TABLET    take 1 tablet by mouth once daily       ALLERGIES     is allergic to sulfa antibiotics. FAMILY HISTORY     indicated that her mother is . She indicated that her father is . She indicated that the status of her sister is unknown. She indicated that her maternal grandmother is . She indicated that her maternal grandfather is . She indicated that her paternal grandmother is .  She indicated that her paternal grandfather is . She indicated that two of her three sons are alive. family history includes Diabetes in her sister, son, son, and son; Stroke in her son. SOCIAL HISTORY      reports that she has quit smoking. She has a 5.00 pack-year smoking history. She has never used smokeless tobacco. She reports that she does not drink alcohol or use drugs. PHYSICAL EXAM     INITIAL VITALS:  weight is 135 lb (61.2 kg). Her oral temperature is 97.8 °F (36.6 °C). Her blood pressure is 145/73 (abnormal) and her pulse is 72. Her respiration is 12 and oxygen saturation is 97%. Constitutional: The patient is alert and well-developed, vital signs as noted. Psychiatric: Oriented to time, place and person, affect is appropriate for age. Eyes: Pupils equal and reactive to light. EOMI. Ears, nose, and throat: Oropharynx clear  Neck: No masses, trachea midline, and no thyromegaly. Respiratory: Clear to auscultation, full aeration throughout all fields. Cardiovascular: No murmurs, heart sounds normal, no thrills. Gastrointestinal: No masses, no hepatosplenomegaly, bowel sounds positive. No tenderness. Skin: No rashes or lesions on exposed surfaces, good skin turgor. Neurological: , no focal neurological findings. Extremities: Good range of motion, no edema. pain with range of motion of the left hip consistent with sciatica  Moderately dehydrated with dry mucous membranes    DIFFERENTIAL DIAGNOSIS/ MEDICAL DECISION MAKING:     IV fluids are administered with improvement    Benign abdomen at discharge    Lomotil to be used as directed     IV Decadron for her sciatica and given prednisone for the next 5 days     Advised to use a probiotic for the next 3 or 4 days and   If diarrhea continues to have C. diff and stool culture performed but the patient could not provide a sample today. Follow Exit Care instructions closely.     I have reviewed the disposition diagnosis with the patient and or their GFR Staging NOT REPORTED        ABNORMAL LABS:  Labs Reviewed   CBC WITH AUTO DIFFERENTIAL - Abnormal; Notable for the following:        Result Value    RBC 3.19 (*)     Hemoglobin 11.3 (*)     Hematocrit 34.0 (*)     .9 (*)     MCH 35.4 (*)     All other components within normal limits   BASIC METABOLIC PANEL - Abnormal; Notable for the following:     Glucose 106 (*)     CREATININE 0.97 (*)     Bun/Cre Ratio 23 (*)     Sodium 134 (*)     Chloride 92 (*)     All other components within normal limits   CULTURE STOOL   CLOSTRIDIUM DIFFICILE TOXIN   UA W/REFLEX CULTURE        EKG: All EKG's are interpreted by the Emergency Department Physician who either signs or Co-signs this chart in the absence of a cardiologist.        EMERGENCY DEPARTMENT COURSE:   Vitals:    Vitals:    11/24/17 1632   BP: (!) 145/73   Pulse: 72   Resp: 12   Temp: 97.8 °F (36.6 °C)   TempSrc: Oral   SpO2: 97%   Weight: 135 lb (61.2 kg)     -------------------------  BP: (!) 145/73, Temp: 97.8 °F (36.6 °C), Pulse: 72, Resp: 12    See DDX/MDM (Differential Diagnosis/Medical Decision Making) above      FINAL IMPRESSION      1. Dehydration    2. General weakness    3. Diarrhea of presumed infectious origin    4. Sciatica, unspecified laterality          DISPOSITION/PLAN   DISPOSITION Decision to Discharge    Condition on Disposition    Fair    PATIENT REFERRED TO:  Marni Tyler DO  [address] [facility] [address]  [phone]    In 3 days        DISCHARGE MEDICATIONS:  New Prescriptions    DIPHENOXYLATE-ATROPINE (LOMOTIL) 2.5-0.025 MG PER TABLET    Take 1 tablet by mouth 4 times daily as needed for Diarrhea .     PREDNISONE (DELTASONE) 50 MG TABLET    Take 1 tablet by mouth daily for 5 days       (Please note that portions of this note were completed with a voice recognition program.  Efforts were made to edit the dictations but occasionally words are mis-transcribed.)    Marli Mao,, DO  Attending Emergency Physician       Kat Mao

## 2017-11-27 NOTE — CARE COORDINATION
Ambulatory Care Coordination ED Follow up Call       Reason for ED Visit:  Diarrhea, weakness, dehydration, sciatica  Care Management Risk Score: CMRS 11  How are you feeling? :     improved  Patient Reports the following:  Diarrhea subsided, eating and drinking intake has improved, still waiting to get a stool to bring in for testing             Contact RN[facility] regarding any worsening symptoms from above. Did you call your PCP prior to going to the ED? No          Post Discharge Status:  What health concerns since you left the Emergency Room?  na    Do you have wounds that you are caring for at home? No    Do you have a follow up appt scheduled?  no - encouraged to call today    Review of Instructions:                                 Do you have any questions regarding your discharge instructions?:  No  Medications:    What questions do you have about your medications? No  Are you taking your medications as directed? If not - why? Yes   Can you afford your medications? yes  ADLS:  Do you need assistance of any kind at home? No   What assistance is needed?  na      FU appts/Provider:    Future Appointments  Date Time Provider Cathy Vann   11/30/2017 10:15 AM SCHEDULE, [facility]   11/30/2017 10:45 AM MD RD [facility] [facility]   1/17/2018 1:30 PM JESSICA Pride DPP   1/23/2018 3:20 PM DO CARLYLE Sevilla DPP       There are no preventive care reminders to display for this patient. Patient advised to contact PCP office to have HM items/records faxed to PCP Office directly?   N/A

## 2017-11-29 NOTE — CARE COORDINATION
Ambulatory Care Coordination Note  11/29/2017  CM Risk Score: Pipo Barrett Mortality Risk Score: 80.92    ACC: Arnulfo Uribe, RN    Summary Note: Jo Lundberg has CHF, COPD, weakness and gait abnormality    Jo Lundberg has been to ER recently x 2- 11/19/2017 low back pain and 11/24/2017- dehydration, diarrhea, and low back pain. Per chart review- IP CC Elizabeth Gaspar called and spoke with caregiver. They were going to schedule f/u with PCP. She forwarded encounter to this writer. No appointment on file. C-Diff was negative 11/28/2017. Called and spoke with Crystal Dickerson. He stated that Jo Lundberg was doing \"real good\". Denied diarrhea and back pain. Declined follow up with PCP. General Assessment    Do you have any symptoms that are causing concern?:  No         Care Coordination Interventions    Program Enrollment:  Complex Care  Referral from Primary Care Provider:  No  Suggested Interventions and Community Resources  Fall Risk Prevention:  Completed  Home Health Services:  Completed  Medi Set or Pill Pack:  Completed  Occupational Therapy:  Completed  Physical Therapy:  Completed  Transportation Support:  Completed  Zone Management Tools:  Completed         Goals Addressed             Most Recent     Self Monitoring   On track (11/29/2017)             Daily Weights - I will weight myself as directed - Daily and write down weights  I will notify my provider of any increase in weight by 3 or more pounds in 2 days OR 5 or more pounds in a week. Family assists with care. None Recently Recorded    Barriers: none  Plan for overcoming my barriers: N/A  Confidence: 8/10  Anticipated Goal Completion Date: 12/30/2017              Prior to Admission medications    Medication Sig Start Date End Date Taking?  Authorizing Provider   predniSONE (DELTASONE) 50 MG tablet Take 1 tablet by mouth daily for 5 days 11/24/17 11/29/17  Reji Mao,    diphenoxylate-atropine (LOMOTIL) 2.5-0.025 MG per tablet Take 1 tablet by mouth 4 times daily as needed for Diarrhea . 11/24/17   Zane Mao, DO   LORazepam (ATIVAN) 0.5 MG tablet Take 1/2 tab during the day and 1 tab at night. May take extra 1/2 tab during the day prn anxiety. 11/6/17   Breanna Paul DO   magnesium gluconate (MAGONATE) 500 MG tablet Take 250 mg by mouth 2 times daily    Historical Provider, MD   omeprazole (PRILOSEC) 20 MG delayed release capsule take 1 capsule by mouth twice a day 10/23/17   Breanna Paul DO   hydrocortisone (PROCTOSOL HC) 2.5 % rectal cream Place rectally 2 times daily Place rectally 2 times daily. 10/23/17   Breanna Paul DO   bumetanide (BUMEX) 1 MG tablet take 1 tablet by mouth twice a day 10/16/17   Breanna Paul DO   spironolactone (ALDACTONE) 25 MG tablet take 1 tablet by mouth once daily 10/16/17   Braenna Paul DO   allopurinol (ZYLOPRIM) 100 MG tablet Take 2 tablets by mouth daily 9/21/17   Breanna Paul DO   polyethylene glycol [facility]) powder Take 17 g by mouth daily 9/21/17   Paola Finn,    Compression Stockings MISC by Does not apply route Dispense 2 pairs, requesting zippered kind. 15-20 mm Hg. 8/28/17   Breanna Paul DO   ondansetron (ZOFRAN) 4 MG tablet Take 1 tablet by mouth every 8 hours as needed for Nausea or Vomiting From [facility] care plan.  8/28/17   Breanna Paul DO   meclizine (ANTIVERT) 25 MG tablet TAKE ONE TABLET BY MOUTH 3 TIMES A DAY AS NEEDED 8/28/17   Breanna Paul DO   simvastatin (ZOCOR) 10 MG tablet take 1 tablet by mouth every evening 8/16/17   Breanna Paul DO   lisinopril (PRINIVIL;ZESTRIL) 5 MG tablet Take 1 tablet by mouth daily Patient to take if BP is over 150/90. 7/10/17   Breanna Paul DO   fluticasone (FLONASE) 50 MCG/ACT nasal spray 1 spray by Nasal route daily    Historical Provider, MD   Nebulizers (COMPRESSOR/NEBULIZER) MISC 1 Device by Does not apply route daily as needed (cough/shortness of breath) Indications: Treatment to Prevent COPD with Bronchospasms 6/7/17   Paola Finn, DO   albuterol

## 2017-11-30 NOTE — PROGRESS NOTES
by mouth 2 times daily    Historical Provider, MD   omeprazole (PRILOSEC) 20 MG delayed release capsule take 1 capsule by mouth twice a day 10/23/17   Casper Paul, DO   hydrocortisone (PROCTOSOL HC) 2.5 % rectal cream Place rectally 2 times daily Place rectally 2 times daily. 10/23/17   Casper Paul, DO   bumetanide (BUMEX) 1 MG tablet take 1 tablet by mouth twice a day 10/16/17   Casper Paul, DO   spironolactone (ALDACTONE) 25 MG tablet take 1 tablet by mouth once daily 10/16/17   Casper Paul, DO   allopurinol (ZYLOPRIM) 100 MG tablet Take 2 tablets by mouth daily 9/21/17   Casper Paul, DO   polyethylene glycol [facility]) powder Take 17 g by mouth daily 9/21/17   Durga Divers, DO   Compression Stockings MISC by Does not apply route Dispense 2 pairs, requesting zippered kind. 15-20 mm Hg. 8/28/17   Casper Paul, DO   ondansetron (ZOFRAN) 4 MG tablet Take 1 tablet by mouth every 8 hours as needed for Nausea or Vomiting From [facility] care plan. 8/28/17   Casper Paul, DO   meclizine (ANTIVERT) 25 MG tablet TAKE ONE TABLET BY MOUTH 3 TIMES A DAY AS NEEDED 8/28/17   Casper Paul, DO   simvastatin (ZOCOR) 10 MG tablet take 1 tablet by mouth every evening 8/16/17   Casper Paul, DO   lisinopril (PRINIVIL;ZESTRIL) 5 MG tablet Take 1 tablet by mouth daily Patient to take if BP is over 150/90. 7/10/17   Casper Paul, DO   fluticasone (FLONASE) 50 MCG/ACT nasal spray 1 spray by Nasal route daily    Historical Provider, MD   Nebulizers (COMPRESSOR/NEBULIZER) MISC 1 Device by Does not apply route daily as needed (cough/shortness of breath) Indications: Treatment to Prevent COPD with Bronchospasms 6/7/17   Casper Paul, DO   albuterol (PROVENTIL) (2.5 MG/3ML) 0.083% nebulizer solution Take 3 mLs by nebulization every 6 hours as needed for Wheezing or Shortness of Breath 6/7/17   Durga Divers, DO   Respiratory Therapy Supplies (NEBULIZER/ADULT MASK) KIT Use with nebulizer machine as directed.  6/7/17   Casper Lloyd Exam: /60 HR 75  Physical Exam   Constitutional: She is oriented to person, place, and time. She appears well-developed and well-nourished. Cardiovascular: Normal rate and regular rhythm. Exam reveals no gallop. Murmur (ejection systolic murmur over the aortic area) heard. Pulmonary/Chest: She has no wheezes. She has no rales. Abdominal: There is no tenderness. There is no rebound and no guarding. Musculoskeletal: She exhibits edema. Neurological: She is alert and oriented to person, place, and time. She has normal reflexes. Psychiatric: She has a normal mood and affect. Her behavior is normal.       Cardiac Data:      Labs:     CBC: No results for input(s): WBC, HGB, HCT, PLT in the last 72 hours. BMP: No results for input(s): NA, K, CO2, BUN, CREATININE, LABGLOM, GLUCOSE in the last 72 hours. PT/INR: No results for input(s): PROTIME, INR in the last 72 hours. FASTING LIPID PANEL:  Lab Results   Component Value Date    HDL 82 10/10/2016    TRIG 78 10/10/2016     LIVER PROFILE:No results for input(s): AST, ALT, LABALBU in the last 72 hours. IMPRESSION:    Patient Active Problem List   Diagnosis    Chronic diastolic congestive heart failure (HCC)    Mixed hyperlipidemia    Essential hypertension    Chronic obstructive pulmonary disease ([address]    Renal insufficiency    Coronary artery disease involving native coronary artery of native heart without angina pectoris    Arrhythmia    Fever    Anxiety    Chest pain    Dermatophytosis of nail    Upper GI bleed    Major depressive disorder with single episode, in partial remission ([address]    Weakness    Gait abnormality       Assessment/Plan:  1-CAD with prior MI to LAD territory in 2008 treated conservatively: Stable with no signs of ischemia. Echo on 8/2016 EF 35-40%, moderate MR and TR and can not R/O clot in LV apex.   Discussed those findings with her and her family, considering her age, would like only medical therapy and will hold on adding any more anticoagulation due to bleeding and fall risk, will continue current medications. 2-HTN: Well controlled on current medications  3-HLP: on Zocor. LDL 56 on 10/2016  4-SSS S/P pacemaker: Checked on 11/2017 functioning normally.         Kade Ochoa MD  [facility]           [phone]

## 2017-12-06 NOTE — TELEPHONE ENCOUNTER
[facility] plan of care reviewed and re-certification completed on patient for service dates 11-27-17 to 1-25-18. Verified current medications and allergies. Physician time spent on activities to coordinate services, documenting, medical decision making and review of reports, treatment plans, and test results is 15 minutes.

## 2017-12-29 NOTE — CARE COORDINATION
Ambulatory Care Coordination Note  12/29/2017  CM Risk Score: 11  Arnold Mortality Risk Score: 69.94    ACC: Vikki Rose, RN    Summary Note: Josette Bojorquez has CHF, COPD, weakness and gait abnormality  She follows with cardiology and podiatry. She has [facility] [address] has been to ER recently x 2- 11/19/2017 low back pain and 11/24/2017- dehydration, diarrhea, and low back pain. Unable to reach [facility]- caregiver- Left message requesting return call.        Future Appointments  Date Time Provider Cathy Vann   1/17/2018 1:30 PM Kari Stewart DPM [facility] DPP   1/23/2018 3:20 PM DO CARLYLE Chowdhury DPP   5/24/2018 11:15 AM SCHEDULE, PACEMAKER [facility] CARDIOLOGY RD DPP   5/24/2018 11:30 AM MD RD [facility]

## 2018-01-01 ENCOUNTER — CARE COORDINATION (OUTPATIENT)
Dept: CARE COORDINATION | Age: 83
End: 2018-01-01

## 2018-01-01 ENCOUNTER — TELEPHONE (OUTPATIENT)
Dept: FAMILY MEDICINE CLINIC | Age: 83
End: 2018-01-01
Payer: MEDICARE

## 2018-01-01 ENCOUNTER — TELEPHONE (OUTPATIENT)
Dept: FAMILY MEDICINE CLINIC | Age: 83
End: 2018-01-01

## 2018-01-01 ENCOUNTER — HOSPITAL ENCOUNTER (EMERGENCY)
Age: 83
Discharge: HOME | End: 2018-01-06
Attending: EMERGENCY MEDICINE
Payer: MEDICARE

## 2018-01-01 ENCOUNTER — OFFICE VISIT (OUTPATIENT)
Dept: ORTHOPEDIC SURGERY | Age: 83
End: 2018-01-01
Payer: MEDICARE

## 2018-01-01 ENCOUNTER — OFFICE VISIT (OUTPATIENT)
Dept: PODIATRY | Age: 83
End: 2018-01-01
Payer: MEDICARE

## 2018-01-01 ENCOUNTER — OFFICE VISIT (OUTPATIENT)
Dept: FAMILY MEDICINE CLINIC | Age: 83
End: 2018-01-01
Payer: MEDICARE

## 2018-01-01 ENCOUNTER — CARE COORDINATION (OUTPATIENT)
Dept: FAMILY MEDICINE CLINIC | Age: 83
End: 2018-01-01

## 2018-01-01 VITALS
BODY MASS INDEX: 23.56 KG/M2 | HEART RATE: 60 BPM | WEIGHT: 138 LBS | HEIGHT: 64 IN | SYSTOLIC BLOOD PRESSURE: 132 MMHG | DIASTOLIC BLOOD PRESSURE: 72 MMHG

## 2018-01-01 VITALS
SYSTOLIC BLOOD PRESSURE: 128 MMHG | DIASTOLIC BLOOD PRESSURE: 74 MMHG | WEIGHT: 138 LBS | BODY MASS INDEX: 23.56 KG/M2 | RESPIRATION RATE: 20 BRPM | HEART RATE: 60 BPM | HEIGHT: 64 IN

## 2018-01-01 VITALS
BODY MASS INDEX: 23.17 KG/M2 | TEMPERATURE: 98.2 F | HEART RATE: 76 BPM | DIASTOLIC BLOOD PRESSURE: 74 MMHG | RESPIRATION RATE: 12 BRPM | SYSTOLIC BLOOD PRESSURE: 154 MMHG | WEIGHT: 135 LBS | OXYGEN SATURATION: 95 %

## 2018-01-01 VITALS
BODY MASS INDEX: 23 KG/M2 | SYSTOLIC BLOOD PRESSURE: 130 MMHG | DIASTOLIC BLOOD PRESSURE: 70 MMHG | WEIGHT: 134 LBS | HEART RATE: 78 BPM

## 2018-01-01 DIAGNOSIS — G89.29 CHRONIC PAIN OF BOTH KNEES: Primary | ICD-10-CM

## 2018-01-01 DIAGNOSIS — H35.3190 NONEXUDATIVE AGE-RELATED MACULAR DEGENERATION, UNSPECIFIED EYE, STAGE UNSPECIFIED: ICD-10-CM

## 2018-01-01 DIAGNOSIS — K57.30 DIVERTICULOSIS OF LARGE INTESTINE WITHOUT PERFORATION OR ABSCESS WITHOUT BLEEDING: ICD-10-CM

## 2018-01-01 DIAGNOSIS — I50.9 CONGESTIVE HEART FAILURE, UNSPECIFIED CONGESTIVE HEART FAILURE CHRONICITY, UNSPECIFIED CONGESTIVE HEART FAILURE TYPE: ICD-10-CM

## 2018-01-01 DIAGNOSIS — I25.5 ISCHEMIC CARDIOMYOPATHY: ICD-10-CM

## 2018-01-01 DIAGNOSIS — Z99.81 DEPENDENCE ON SUPPLEMENTAL OXYGEN: ICD-10-CM

## 2018-01-01 DIAGNOSIS — Z87.891 PERSONAL HISTORY OF TOBACCO USE, PRESENTING HAZARDS TO HEALTH: ICD-10-CM

## 2018-01-01 DIAGNOSIS — I47.20 VENTRICULAR TACHYCARDIA: ICD-10-CM

## 2018-01-01 DIAGNOSIS — I48.91 ATRIAL FIBRILLATION, UNSPECIFIED TYPE (HCC): ICD-10-CM

## 2018-01-01 DIAGNOSIS — F41.9 ANXIETY: ICD-10-CM

## 2018-01-01 DIAGNOSIS — I25.10 ATHEROSCLEROSIS OF NATIVE CORONARY ARTERY OF NATIVE HEART WITHOUT ANGINA PECTORIS: ICD-10-CM

## 2018-01-01 DIAGNOSIS — I10 ESSENTIAL HYPERTENSION: ICD-10-CM

## 2018-01-01 DIAGNOSIS — M54.31 SCIATICA OF RIGHT SIDE: Primary | ICD-10-CM

## 2018-01-01 DIAGNOSIS — J44.9 CHRONIC OBSTRUCTIVE PULMONARY DISEASE, UNSPECIFIED COPD TYPE (HCC): ICD-10-CM

## 2018-01-01 DIAGNOSIS — I50.32 CHRONIC DIASTOLIC CONGESTIVE HEART FAILURE (HCC): ICD-10-CM

## 2018-01-01 DIAGNOSIS — I11.0 HYPERTENSIVE HEART DISEASE WITH HEART FAILURE (HCC): Primary | ICD-10-CM

## 2018-01-01 DIAGNOSIS — M17.0 PRIMARY OSTEOARTHRITIS OF BOTH KNEES: Primary | ICD-10-CM

## 2018-01-01 DIAGNOSIS — B35.1 DERMATOPHYTOSIS OF NAIL: Primary | ICD-10-CM

## 2018-01-01 DIAGNOSIS — I73.9 PVD (PERIPHERAL VASCULAR DISEASE) (HCC): ICD-10-CM

## 2018-01-01 DIAGNOSIS — I25.2 OLD MYOCARDIAL INFARCTION: ICD-10-CM

## 2018-01-01 DIAGNOSIS — Z51.5 ENCOUNTER FOR PALLIATIVE CARE: ICD-10-CM

## 2018-01-01 DIAGNOSIS — M25.562 CHRONIC PAIN OF BOTH KNEES: Primary | ICD-10-CM

## 2018-01-01 DIAGNOSIS — K21.9 GASTROESOPHAGEAL REFLUX DISEASE WITHOUT ESOPHAGITIS: ICD-10-CM

## 2018-01-01 DIAGNOSIS — G89.4 CHRONIC PAIN SYNDROME: ICD-10-CM

## 2018-01-01 DIAGNOSIS — M54.31 RIGHT SIDED SCIATICA: Primary | ICD-10-CM

## 2018-01-01 DIAGNOSIS — M25.561 CHRONIC PAIN OF BOTH KNEES: Primary | ICD-10-CM

## 2018-01-01 DIAGNOSIS — Z95.0 CARDIAC PACEMAKER IN SITU: ICD-10-CM

## 2018-01-01 DIAGNOSIS — I49.5 SICK SINUS SYNDROME (HCC): ICD-10-CM

## 2018-01-01 PROCEDURE — G0179 MD RECERTIFICATION HHA PT: HCPCS | Performed by: FAMILY MEDICINE

## 2018-01-01 PROCEDURE — G8427 DOCREV CUR MEDS BY ELIG CLIN: HCPCS | Performed by: FAMILY MEDICINE

## 2018-01-01 PROCEDURE — 11721 DEBRIDE NAIL 6 OR MORE: CPT | Performed by: PODIATRIST

## 2018-01-01 PROCEDURE — 96372 THER/PROPH/DIAG INJ SC/IM: CPT

## 2018-01-01 PROCEDURE — 1036F TOBACCO NON-USER: CPT | Performed by: FAMILY MEDICINE

## 2018-01-01 PROCEDURE — 4040F PNEUMOC VAC/ADMIN/RCVD: CPT | Performed by: FAMILY MEDICINE

## 2018-01-01 PROCEDURE — 1123F ACP DISCUSS/DSCN MKR DOCD: CPT | Performed by: FAMILY MEDICINE

## 2018-01-01 PROCEDURE — G8598 ASA/ANTIPLAT THER USED: HCPCS | Performed by: FAMILY MEDICINE

## 2018-01-01 PROCEDURE — G8420 CALC BMI NORM PARAMETERS: HCPCS | Performed by: FAMILY MEDICINE

## 2018-01-01 PROCEDURE — 6370000000 HC RX 637 (ALT 250 FOR IP): Performed by: EMERGENCY MEDICINE

## 2018-01-01 PROCEDURE — 99283 EMERGENCY DEPT VISIT LOW MDM: CPT

## 2018-01-01 PROCEDURE — 6360000002 HC RX W HCPCS: Performed by: EMERGENCY MEDICINE

## 2018-01-01 PROCEDURE — 1090F PRES/ABSN URINE INCON ASSESS: CPT | Performed by: FAMILY MEDICINE

## 2018-01-01 PROCEDURE — 99213 OFFICE O/P EST LOW 20 MIN: CPT | Performed by: FAMILY MEDICINE

## 2018-01-01 PROCEDURE — G8484 FLU IMMUNIZE NO ADMIN: HCPCS | Performed by: FAMILY MEDICINE

## 2018-01-01 PROCEDURE — 20610 DRAIN/INJ JOINT/BURSA W/O US: CPT | Performed by: FAMILY MEDICINE

## 2018-01-01 RX ORDER — BUPIVACAINE HYDROCHLORIDE 5 MG/ML
4 INJECTION, SOLUTION PERINEURAL ONCE
Status: COMPLETED | OUTPATIENT
Start: 2018-01-01 | End: 2018-01-01

## 2018-01-01 RX ORDER — PREDNISONE 20 MG/1
20 TABLET ORAL ONCE
Status: COMPLETED | OUTPATIENT
Start: 2018-01-01 | End: 2018-01-01

## 2018-01-01 RX ORDER — BUMETANIDE 1 MG/1
TABLET ORAL
Qty: 180 TABLET | Refills: 1 | Status: SHIPPED | OUTPATIENT
Start: 2018-01-01

## 2018-01-01 RX ORDER — PREDNISONE 20 MG/1
20 TABLET ORAL DAILY
Qty: 5 TABLET | Refills: 0 | Status: SHIPPED | OUTPATIENT
Start: 2018-01-01 | End: 2018-01-01

## 2018-01-01 RX ORDER — LORAZEPAM 0.5 MG/1
TABLET ORAL
Qty: 60 TABLET | Refills: 0 | Status: SHIPPED | OUTPATIENT
Start: 2018-01-01 | End: 2018-01-01

## 2018-01-01 RX ORDER — TRIAMCINOLONE ACETONIDE 40 MG/ML
40 INJECTION, SUSPENSION INTRA-ARTICULAR; INTRAMUSCULAR ONCE
Status: COMPLETED | OUTPATIENT
Start: 2018-01-01 | End: 2018-01-01

## 2018-01-01 RX ORDER — OMEPRAZOLE 20 MG/1
CAPSULE, DELAYED RELEASE ORAL
Qty: 180 CAPSULE | Refills: 3 | Status: SHIPPED | OUTPATIENT
Start: 2018-01-01

## 2018-01-01 RX ORDER — MORPHINE SULFATE 2 MG/ML
2 INJECTION, SOLUTION INTRAMUSCULAR; INTRAVENOUS ONCE
Status: COMPLETED | OUTPATIENT
Start: 2018-01-01 | End: 2018-01-01

## 2018-01-01 RX ORDER — PREDNISONE 20 MG/1
20 TABLET ORAL DAILY
Qty: 5 TABLET | Refills: 0 | Status: SHIPPED | OUTPATIENT
Start: 2018-01-01 | End: 2018-01-01 | Stop reason: SDUPTHER

## 2018-01-01 RX ADMIN — PREDNISONE 20 MG: 20 TABLET ORAL at 13:22

## 2018-01-01 RX ADMIN — TRIAMCINOLONE ACETONIDE 40 MG: 40 INJECTION, SUSPENSION INTRA-ARTICULAR; INTRAMUSCULAR at 07:39

## 2018-01-01 RX ADMIN — TRIAMCINOLONE ACETONIDE 40 MG: 40 INJECTION, SUSPENSION INTRA-ARTICULAR; INTRAMUSCULAR at 07:38

## 2018-01-01 RX ADMIN — MORPHINE SULFATE 2 MG: 2 INJECTION, SOLUTION INTRAMUSCULAR; INTRAVENOUS at 12:56

## 2018-01-01 RX ADMIN — BUPIVACAINE HYDROCHLORIDE 20 MG: 5 INJECTION, SOLUTION PERINEURAL at 07:40

## 2018-01-01 ASSESSMENT — PAIN SCALES - GENERAL
PAINLEVEL_OUTOF10: 10
PAINLEVEL_OUTOF10: 10

## 2018-01-01 ASSESSMENT — ENCOUNTER SYMPTOMS
BACK PAIN: 1
SHORTNESS OF BREATH: 0
CONSTIPATION: 1

## 2018-01-01 ASSESSMENT — PAIN DESCRIPTION - ORIENTATION: ORIENTATION: RIGHT

## 2018-01-01 ASSESSMENT — PAIN DESCRIPTION - DESCRIPTORS: DESCRIPTORS: SHARP

## 2018-01-01 ASSESSMENT — PAIN DESCRIPTION - LOCATION: LOCATION: LEG

## 2018-01-01 ASSESSMENT — PAIN DESCRIPTION - ONSET: ONSET: ON-GOING

## 2018-01-01 ASSESSMENT — PAIN DESCRIPTION - PROGRESSION: CLINICAL_PROGRESSION: NOT CHANGED

## 2018-01-01 ASSESSMENT — PAIN DESCRIPTION - FREQUENCY: FREQUENCY: INTERMITTENT

## 2018-01-01 ASSESSMENT — PAIN DESCRIPTION - PAIN TYPE: TYPE: CHRONIC PAIN

## 2018-01-18 NOTE — CARE COORDINATION
Ambulatory Care Coordination Note  1/18/2018  CM Risk Score: 11  Arnold Mortality Risk Score: 69.94    ACC: Milton Orellana, RN    Summary Note: Sandrea Leventhal has CHF, COPD, weakness and gait abnormality  She follows with cardiology and podiatry. She has [facility] [address]. Sandrea Leventhal was seen in the ER 1/6/2018 for sciatica. She received an injection of morphine. Spoke with sonUlysses Artis. He stated that Sandrea Leventhal was doing better. Her sciatica usually clears once she gets an injection. He denied any concerns. Reviewed upcoming appointment. Continue support and education. Congestive Heart Failure Assessment    Are you currently restricting fluids?:  No Restriction  Do you understand a low sodium diet?:  Yes (Comment: family assists)  Do you understand how to read food labels?:  Yes (Comment: family assists)  How many restaurant meals do you eat per week?:  0  Do you salt your food before tasting it?:  No         Symptoms:         ,   COPD Assessment    Does the patient understand envrionmental exposure?:  Yes  Does the patient have a nebulizer?:  No            Symptoms:          and   General Assessment    Do you have any symptoms that are causing concern?:  No       Care Coordination Interventions    Program Enrollment:  Complex Care  Referral from Primary Care Provider:  No  Suggested Interventions and Community Resources  Fall Risk Prevention:  Completed  Home Health Services:  Completed  Medi Set or Pill Pack:  Completed  Occupational Therapy:  Completed  Physical Therapy:  Completed  Transportation Support:  Completed  Zone Management Tools:  Completed         Goals Addressed             Most Recent     Self Monitoring   On track (1/18/2018)             Daily Weights - I will weight myself as directed - Daily and write down weights  I will notify my provider of any increase in weight by 3 or more pounds in 2 days OR 5 or more pounds in a week. Family assists with care.      None Recently

## 2018-01-23 NOTE — PROGRESS NOTES
CHOLECYSTECTOMY      COLONOSCOPY  08/22/2012    DILATION AND CURETTAGE OF UTERUS  1993    HEMORRHOID SURGERY      PACEMAKER PLACEMENT      THORACENTESIS      UPPER GASTROINTESTINAL ENDOSCOPY  07/27/2012    EGD.  YAG CAPSULOTOMY Right 10/21/2014    Dr Caitlin Ortiz     Family History   Problem Relation Age of Onset    Diabetes Son     Diabetes Son     Diabetes Son     Stroke Son     Diabetes Sister      Social History   Substance Use Topics    Smoking status: Former Smoker     Packs/day: 0.25     Years: 20.00    Smokeless tobacco: Never Used      Rocael Call, RRT 10/17/16    Alcohol use No      Current Outpatient Prescriptions   Medication Sig Dispense Refill    bumetanide (BUMEX) 1 MG tablet take 1 tablet by mouth twice a day 180 tablet 1    loratadine (CLARITIN) 10 MG tablet take 1 tablet by mouth once daily 30 tablet 11    diphenoxylate-atropine (LOMOTIL) 2.5-0.025 MG per tablet Take 1 tablet by mouth 4 times daily as needed for Diarrhea . 20 tablet 0    magnesium gluconate (MAGONATE) 500 MG tablet Take 250 mg by mouth 2 times daily      omeprazole (PRILOSEC) 20 MG delayed release capsule take 1 capsule by mouth twice a day 180 capsule 1    hydrocortisone (PROCTOSOL HC) 2.5 % rectal cream Place rectally 2 times daily Place rectally 2 times daily. 1 Tube 2    spironolactone (ALDACTONE) 25 MG tablet take 1 tablet by mouth once daily 90 tablet 1    allopurinol (ZYLOPRIM) 100 MG tablet Take 2 tablets by mouth daily 180 tablet 3    polyethylene glycol (GLYCOLAX) powder Take 17 g by mouth daily 510 g 3    Compression Stockings MISC by Does not apply route Dispense 2 pairs, requesting zippered kind. 15-20 mm Hg. 2 each 0    ondansetron (ZOFRAN) 4 MG tablet Take 1 tablet by mouth every 8 hours as needed for Nausea or Vomiting From [facility] care plan.  40 tablet 2    meclizine (ANTIVERT) 25 MG tablet TAKE ONE TABLET BY MOUTH 3 TIMES A DAY AS NEEDED 40 tablet 2    simvastatin (ZOCOR) 10 MG tablet take 1 tablet by mouth every evening 90 tablet 3    lisinopril (PRINIVIL;ZESTRIL) 5 MG tablet Take 1 tablet by mouth daily Patient to take if BP is over 150/90. 30 tablet 1    Nebulizers (COMPRESSOR/NEBULIZER) MISC 1 Device by Does not apply route daily as needed (cough/shortness of breath) Indications: Treatment to Prevent COPD with Bronchospasms 1 each 0    albuterol (PROVENTIL) (2.5 MG/3ML) 0.083% nebulizer solution Take 3 mLs by nebulization every 6 hours as needed for Wheezing or Shortness of Breath 120 vial 1    Respiratory Therapy Supplies (NEBULIZER/ADULT MASK) KIT Use with nebulizer machine as directed. 1 kit 0    Lift Chair MISC by Does not apply route 1 each 0    simethicone (MYLICON) 80 MG chewable tablet Take 80 mg by mouth 3 times daily as needed for Flatulence From [facility]im  cre plan.  polyvinyl alcohol (ARTIFICIAL TEARS) 1.4 % ophthalmic solution Place 1 drop into both eyes 2 times daily as needed for Dry Eyes From [facility] care plan.  aspirin 81 MG EC tablet Take 81 mg by mouth daily      docusate sodium (COLACE) 100 MG capsule Take 100 mg by mouth 2 times daily as needed for Constipation      ferrous sulfate 325 (65 FE) MG tablet Take 325 mg by mouth 3 times daily (with meals)       acetaminophen (TYLENOL) 325 MG tablet Take 650 mg by mouth every 6 hours as needed for Pain      OXYGEN Inhale 2 L into the lungs nightly.  Multiple Vitamins-Minerals (CENTRUM SILVER PO) Take 1 tablet by mouth daily.  LORazepam (ATIVAN) 0.5 MG tablet Take 1/2 tab during the day and 1 tab at night. May take extra 1/2 tab during the day prn anxiety. . 60 tablet 0     No current facility-administered medications for this visit.       Allergies   Allergen Reactions    Sulfa Antibiotics Hives     Other reaction(s): Unknown       Health Maintenance   Topic Date Due    Shingles Vaccine (1 of 2 - 2 Dose Series) [birth year]/1965    DTaP/Tdap/Td vaccine (1 - Tdap) 03/06/2018 (Originally 1934)    Potassium monitoring  11/24/2018    Creatinine monitoring  11/24/2018    Flu vaccine  Completed    Pneumococcal low/med risk  Completed       Subjective:      Review of Systems   Gastrointestinal: Positive for constipation (intermittent). Musculoskeletal: Positive for back pain (intermittent). Psychiatric/Behavioral: Negative for hallucinations. Objective:     Vitals:    01/23/18 1543   BP: 130/70   Site: Right Arm   Position: Sitting   Cuff Size: Large Adult   Pulse: 78   Weight: 134 lb (60.8 kg)     Physical Exam   Constitutional: She is oriented to person, place, and time. She appears well-developed and well-nourished. No distress. HENT:   Head: Normocephalic and atraumatic. Eyes: Conjunctivae are normal.   Neck: Neck supple. Cardiovascular: Normal rate and regular rhythm. Murmur (systolic, 2/6) heard. Pulmonary/Chest: Effort normal and breath sounds normal. No respiratory distress. Abdominal: Soft. Bowel sounds are normal. She exhibits no distension. There is no tenderness. Musculoskeletal: She exhibits tenderness (b/l lower extremities (knee joints and quadricep areas of upper legs)). She exhibits no edema. Neurological: She is alert and oriented to person, place, and time. Skin: Skin is warm and dry. Psychiatric: She has a normal mood and affect. Assessment:      1. Sciatica of right side  predniSONE (DELTASONE) 20 MG tablet   2. Essential hypertension           Plan:      Since symptoms are recurrent, will send in Rx for Prednisone for pt to use the next time she has sciatica symptoms; family members to alert the office when she starts taking it. Return in about 3 months (around 4/23/2018) for Follow-up. Orders Placed This Encounter   Medications    predniSONE (DELTASONE) 20 MG tablet     Sig: Take 1 tablet by mouth daily for 5 days     Dispense:  5 tablet     Refill:  0       Patient given educational materials - see patient instructions. Discussed use of prescribed medications. All patient questions answered. Pt voiced understanding.           Electronically signed by Airam Gibbons DO on 2/22/2018 at 11:48 PM

## 2018-01-23 NOTE — CARE COORDINATION
Ambulatory Care Coordination Note  1/24/2018  CM Risk Score: 11  Arnold Mortality Risk Score: 69.94    ACC: Alek Elkins RN    Summary Note:  Yadira Lancaster has CHF, COPD, weakness and gait abnormality  She follows with cardiology and podiatry. She has [facility] [address]. Met with Yadira Lancaster and family while here to see PCP. Family stated that Yadira Lancaster suffers from sciatica \"off and on\" and they usually take to ER since Morphine injection and prescription of Prednisone help. ER physician discussed that seeking care at onset- may just need Prednisone to take. They plan to discuss with PCP today. Educated on sign, symptoms of flu, treatment- early intervention, prevention-to avoid crowds, hand hygiene, vaccination- Flu, reviewed Clinic and Urgent Care Hours. Instructed to call [facility] or office if symptoms arise. Handout given.      Congestive Heart Failure Assessment    Are you currently restricting fluids?:  No Restriction  Do you understand a low sodium diet?:  Yes (Comment: family assists)  Do you understand how to read food labels?:  Yes (Comment: family assists)  How many restaurant meals do you eat per week?:  0  Do you salt your food before tasting it?:  No     No patient-reported symptoms      Symptoms:         ,   COPD Assessment    Does the patient understand envrionmental exposure?:  Yes  Does the patient have a nebulizer?:  No     No patient-reported symptoms         Symptoms:          and   General Assessment    Do you have any symptoms that are causing concern?:  Yes  Progression since Onset:  Gradually Worsening  Reported Symptoms:  Pain (Comment: sciatica issues)               Care Coordination Interventions    Program Enrollment:  Complex Care  Referral from Primary Care Provider:  No  Suggested Interventions and Community Resources  Fall Risk Prevention:  Completed  Home Health Services:  Completed  Medi Set or Pill Pack:  Completed  Occupational Therapy:  Completed  Physical Therapy: Completed  Transportation Support:  Completed  Zone Management Tools:  Completed (Comment: CHF, COPD)         Goals Addressed             Most Recent     Self Monitoring   On track (1/23/2018)             Daily Weights - I will weight myself as directed - Daily and write down weights  I will notify my provider of any increase in weight by 3 or more pounds in 2 days OR 5 or more pounds in a week. Family assists with care. None Recently Recorded    Barriers: none  Plan for overcoming my barriers: N/A  Confidence: 8/10  Anticipated Goal Completion Date: 4/30/2018              Prior to Admission medications    Medication Sig Start Date End Date Taking? Authorizing Provider   predniSONE (DELTASONE) 20 MG tablet Take 1 tablet by mouth daily for 5 days 1/23/18 1/28/18  Lionell Runner Black, DO   bumetanide (BUMEX) 1 MG tablet take 1 tablet by mouth twice a day 1/12/18   Lionell Runner Black, DO   LORazepam (ATIVAN) 0.5 MG tablet Take 1/2 tab during the day and 1 tab at night. May take extra 1/2 tab during the day prn anxiety. . 12/20/17   Romayne Manzanilla, DO   loratadine (CLARITIN) 10 MG tablet take 1 tablet by mouth once daily 12/15/17   Lionell Runner Black, DO   diphenoxylate-atropine (LOMOTIL) 2.5-0.025 MG per tablet Take 1 tablet by mouth 4 times daily as needed for Diarrhea . 11/24/17   Zane Mao, DO   magnesium gluconate (MAGONATE) 500 MG tablet Take 250 mg by mouth 2 times daily    Historical Provider, MD   omeprazole (PRILOSEC) 20 MG delayed release capsule take 1 capsule by mouth twice a day 10/23/17   Lionell Runner Black, DO   hydrocortisone (PROCTOSOL HC) 2.5 % rectal cream Place rectally 2 times daily Place rectally 2 times daily.  10/23/17   Lionell Runner Black, DO   spironolactone (ALDACTONE) 25 MG tablet take 1 tablet by mouth once daily 10/16/17   Lionell Runner Black, DO   allopurinol (ZYLOPRIM) 100 MG tablet Take 2 tablets by mouth daily 9/21/17   Lionell Runner Black, DO   polyethylene glycol (GLYCOLAX) powder Take 17 g by mouth daily 9/21/17   Sandra Vera DO   Compression Stockings MISC by Does not apply route Dispense 2 pairs, requesting zippered kind. 15-20 mm Hg. 8/28/17   Casper Paul DO   ondansetron (ZOFRAN) 4 MG tablet Take 1 tablet by mouth every 8 hours as needed for Nausea or Vomiting From [facility] care plan. 8/28/17   Casper Paul DO   meclizine (ANTIVERT) 25 MG tablet TAKE ONE TABLET BY MOUTH 3 TIMES A DAY AS NEEDED 8/28/17   Casper Paul, DO   simvastatin (ZOCOR) 10 MG tablet take 1 tablet by mouth every evening 8/16/17   Casper Paul, DO   lisinopril (PRINIVIL;ZESTRIL) 5 MG tablet Take 1 tablet by mouth daily Patient to take if BP is over 150/90. 7/10/17   Casper Paul DO   Nebulizers (COMPRESSOR/NEBULIZER) MISC 1 Device by Does not apply route daily as needed (cough/shortness of breath) Indications: Treatment to Prevent COPD with Bronchospasms 6/7/17   Casper Paul DO   albuterol (PROVENTIL) (2.5 MG/3ML) 0.083% nebulizer solution Take 3 mLs by nebulization every 6 hours as needed for Wheezing or Shortness of Breath 6/7/17   Sandra Vera DO   Respiratory Therapy Supplies (NEBULIZER/ADULT MASK) KIT Use with nebulizer machine as directed. 6/7/17   Sandra Vera DO   Lift Chair [id number] by Does not apply route 3/29/17   Casper Paul DO   simethicone (MYLICON) 80 MG chewable tablet Take 80 mg by mouth 3 times daily as needed for Flatulence From Inter[facility] cre plan. Historical Provider, MD   polyvinyl alcohol (ARTIFICIAL TEARS) 1.4 % ophthalmic solution Place 1 drop into both eyes 2 times daily as needed for Dry Eyes From Interim [facility] care plan.     Historical Provider, MD   aspirin 81 MG EC tablet Take 81 mg by mouth daily    Historical Provider, MD   docusate sodium (COLACE) 100 MG capsule Take 100 mg by mouth 2 times daily as needed for Constipation    Historical Provider, MD   ferrous sulfate 325 (65 FE) MG tablet Take 325 mg by mouth 3 times daily (with meals)     Historical Provider, MD   acetaminophen (TYLENOL)

## 2018-01-24 NOTE — PATIENT INSTRUCTIONS
Patient Education        Influenza (Flu): Care Instructions  Your Care Instructions    Influenza (flu) is an infection in the lungs and breathing passages. It is caused by the influenza virus. There are different strains, or types, of the flu virus from year to year. Unlike the common cold, the flu comes on suddenly and the symptoms, such as a cough, congestion, fever, chills, fatigue, aches, and pains, are more severe. These symptoms may last up to 10 days. Although the flu can make you feel very sick, it usually doesn't cause serious health problems. Home treatment is usually all you need for flu symptoms. But your doctor may prescribe antiviral medicine to prevent other health problems, such as pneumonia, from developing. Older people and those who have a long-term health condition, such as lung disease, are most at risk for having pneumonia or other health problems. Follow-up care is a key part of your treatment and safety. Be sure to make and go to all appointments, and call your doctor if you are having problems. It's also a good idea to know your test results and keep a list of the medicines you take. How can you care for yourself at home? · Get plenty of rest.  · Drink plenty of fluids, enough so that your urine is light yellow or clear like water. If you have kidney, heart, or liver disease and have to limit fluids, talk with your doctor before you increase the amount of fluids you drink. · Take an over-the-counter pain medicine if needed, such as acetaminophen (Tylenol), ibuprofen (Advil, Motrin), or naproxen (Aleve), to relieve fever, headache, and muscle aches. Read and follow all instructions on the label. No one younger than 20 should take aspirin. It has been linked to Reye syndrome, a serious illness. · Do not smoke. Smoking can make the flu worse. If you need help quitting, talk to your doctor about stop-smoking programs and medicines.  These can increase your chances of quitting for your doctor if:  ? · You begin to get better and then get worse. ? · You are not getting better after 1 week. Where can you learn more? Go to [email or web address] and sign in to your [employer] account. Enter E153 in the [facility] box to learn more about \"Influenza (Flu): Care Instructions. \"     If you do not have an account, please click on the \"Sign Up Now\" link. Current as of: May 12, 2017  Content Version: 11.5  © 4505-9043 [employer], Inc[employer]. Care instructions adapted under license by [facility] ([facility]). If you have questions about a medical condition or this instruction, always ask your healthcare professional. [address] any warranty or liability for your use of this information.

## 2018-02-03 NOTE — TELEPHONE ENCOUNTER
Controlled Substances Monitoring:     Attestation: The Prescription Monitoring Report for this patient was reviewed today.  (Bradd Osgood, DO)  Documentation: No signs of potential drug abuse or diversion identified. (Bradd Osgood, DO)

## 2018-02-15 NOTE — TELEPHONE ENCOUNTER
Missy Bedoya stopped in - [employer] is having bilateral leg/ knee pain again - started about a week after prednisone was finished. She was going to stop down the  to Dr Leola Carlisle' window to schedule her for more knee injections (appt made for 2-21-18). She didn't know if she'd be able to take more prednisone before the Ortho appt. She is also taking two 500 mg tabs of Tylenol TID & wanted to know if she could take more but I told her that was usually the maximum amount.

## 2018-02-15 NOTE — TELEPHONE ENCOUNTER
[facility] plan of care reviewed and certification completed on patient for service dates 1-26-18 to 3-26-18. Verified current medications and allergies. Physician time spent on activities to coordinate care, documenting, medical decision making, and review of reports, treatment plans, and test results is 15 minutes.

## 2018-02-15 NOTE — TELEPHONE ENCOUNTER
Yes, agree with having pt stick with current Tylenol intake, as 3000 mg is the maximum recommended daily dose. Yes, pt can take another 5-day course of Prednisone, if desired, to help with pain until Ortho appt next week. Will send to pharmacy now.

## 2018-02-21 NOTE — PROGRESS NOTES
Sports Medicine Consultation     CHIEF COMPLAINT:  Knee Pain (rech jethro knees)      HPI:  Samuel Rico is a 8 y.o. year old female who is a  established patient being seen for regarding follow up of a pre-existing problem bilateral knee pain. The pain has been present for  year(s). The patient recalls a no new injury. The patient has tried cortisone with improvement. The pain is described as achy. There is  pain on weightbearing. The knee does swell. There is is  painful popping and clicking. The knee does not catch or lock. It has not given out. It is not stiff upon arising from sitting. It is  painful to go up and down stairs and sit for a prolonged period of time. she has a past medical history of Age-related macular degeneration, dry; Arrhythmia; Atrial fibrillation ([address] Cataract, left eye; Chronic obstructive pulmonary disease ([address] Congestive heart failure ([address] Coronary artery disease; Diverticulitis; Gastrointestinal bleed; GERD (gastroesophageal reflux disease); History of blood transfusion; History of recurrent UTI (urinary tract infection); Hyperlipidemia; Hypertension; Ischemic cardiomyopathy; Myocardial infarction; Nonsustained ventricular tachycardia ([address] Pacemaker; Posterior vitreous detachment of left eye; Pseudophakia of right eye; Renal insufficiency; and Sick sinus syndrome ([address] she has a past surgical history that includes Upper gastrointestinal endoscopy (07/27/2012); Appendectomy; Cardiac pacemaker placement (06/26/2012); Carpal tunnel release; Yag capsulotomy (Right, 10/21/2014); Cataract removal with implant (Left, 11/18/2014); Cholecystectomy; Hemorrhoid surgery; thoracentesis; Colonoscopy (08/22/2012); Dilation and curettage of uterus (1993); and pacemaker placement. family history includes Diabetes in her sister, son, son, and son; Stroke in her son. Social History     Social History    Marital status:       Spouse name: N/A   Cheli Morrison Number of children: N/A    Years of education: N/A     Occupational History    Not on file. Social History Main Topics    Smoking status: Former Smoker     Packs/day: 0.25     Years: 20.00    Smokeless tobacco: Never Used      Carroll Nguyen RRT 10/17/16    Alcohol use No    Drug use: No    Sexual activity: No     Other Topics Concern    Not on file     Social History Narrative    No narrative on file       Current Outpatient Prescriptions   Medication Sig Dispense Refill    LORazepam (ATIVAN) 0.5 MG tablet Take 1/2 tab during the day and 1 tab at night. May take extra 1/2 tab during the day prn anxiety. . 60 tablet 0    bumetanide (BUMEX) 1 MG tablet take 1 tablet by mouth twice a day 180 tablet 1    loratadine (CLARITIN) 10 MG tablet take 1 tablet by mouth once daily 30 tablet 11    diphenoxylate-atropine (LOMOTIL) 2.5-0.025 MG per tablet Take 1 tablet by mouth 4 times daily as needed for Diarrhea . 20 tablet 0    magnesium gluconate (MAGONATE) 500 MG tablet Take 250 mg by mouth 2 times daily      omeprazole (PRILOSEC) 20 MG delayed release capsule take 1 capsule by mouth twice a day 180 capsule 1    hydrocortisone (PROCTOSOL HC) 2.5 % rectal cream Place rectally 2 times daily Place rectally 2 times daily. 1 Tube 2    spironolactone (ALDACTONE) 25 MG tablet take 1 tablet by mouth once daily 90 tablet 1    allopurinol (ZYLOPRIM) 100 MG tablet Take 2 tablets by mouth daily 180 tablet 3    polyethylene glycol (GLYCOLAX) powder Take 17 g by mouth daily 510 g 3    Compression Stockings MISC by Does not apply route Dispense 2 pairs, requesting zippered kind. 15-20 mm Hg. 2 each 0    ondansetron (ZOFRAN) 4 MG tablet Take 1 tablet by mouth every 8 hours as needed for Nausea or Vomiting From [facility] care plan.  40 tablet 2    meclizine (ANTIVERT) 25 MG tablet TAKE ONE TABLET BY MOUTH 3 TIMES A DAY AS NEEDED 40 tablet 2    simvastatin (ZOCOR) 10 MG tablet take 1 tablet by mouth every evening 90 tablet 3    lisinopril (PRINIVIL;ZESTRIL) 5 MG tablet Take 1 tablet by mouth daily Patient to take if BP is over 150/90. 30 tablet 1    Nebulizers (COMPRESSOR/NEBULIZER) MISC 1 Device by Does not apply route daily as needed (cough/shortness of breath) Indications: Treatment to Prevent COPD with Bronchospasms 1 each 0    albuterol (PROVENTIL) (2.5 MG/3ML) 0.083% nebulizer solution Take 3 mLs by nebulization every 6 hours as needed for Wheezing or Shortness of Breath 120 vial 1    Respiratory Therapy Supplies (NEBULIZER/ADULT MASK) KIT Use with nebulizer machine as directed. 1 kit 0    Lift Chair MISC by Does not apply route 1 each 0    simethicone (MYLICON) 80 MG chewable tablet Take 80 mg by mouth 3 times daily as needed for Flatulence From [facility]  cre plan.  polyvinyl alcohol (ARTIFICIAL TEARS) 1.4 % ophthalmic solution Place 1 drop into both eyes 2 times daily as needed for Dry Eyes From [facility] care plan.  aspirin 81 MG EC tablet Take 81 mg by mouth daily      docusate sodium (COLACE) 100 MG capsule Take 100 mg by mouth 2 times daily as needed for Constipation      ferrous sulfate 325 (65 FE) MG tablet Take 325 mg by mouth 3 times daily (with meals)       acetaminophen (TYLENOL) 325 MG tablet Take 650 mg by mouth every 6 hours as needed for Pain      OXYGEN Inhale 2 L into the lungs nightly.  Multiple Vitamins-Minerals (CENTRUM SILVER PO) Take 1 tablet by mouth daily.        Current Facility-Administered Medications   Medication Dose Route Frequency Provider Last Rate Last Dose    bupivacaine (MARCAINE) 0.5 % injection 20 mg  4 mL Intra-articular Once Alejandrina Listen, DO        triamcinolone acetonide (KENALOG-40) injection 40 mg  40 mg Intra-articular Once Alejandrina Listen, DO        bupivacaine (MARCAINE) 0.5 % injection 20 mg  4 mL Intra-articular Once Alejandrina Listen, DO        triamcinolone acetonide (KENALOG-40) injection 40 mg  40 mg Intra-articular Once Alejandrina Listen,

## 2018-02-28 NOTE — CARE COORDINATION
Ambulatory Care Coordination Note  2/28/2018  CM Risk Score: 11  Arnold Mortality Risk Score: 69.94    ACC: Pushpa Ruelas RN    Summary Note: Reji Godoy has CHF, COPD, weakness and gait abnormality  She follows with cardiology, ortho and podiatry. She has [facility]. Spoke with son. He stated Reji Godoy was doing well. Denied any concerns/issues. She is reaceiving services for Palliative Care through [facility]. Continue support, education, and assessments. General Assessment    Do you have any symptoms that are causing concern?:  No         Congestive Heart Failure Assessment    Are you currently restricting fluids?:  No Restriction  Do you understand a low sodium diet?:  Yes (Comment: family assists)  Do you understand how to read food labels?:  Yes (Comment: family assists)  How many restaurant meals do you eat per week?:  0  Do you salt your food before tasting it?:  No         Symptoms:          and   COPD Assessment    Does the patient understand envrionmental exposure?:  Yes  Does the patient have a nebulizer?:  No            Symptoms:               Care Coordination Interventions    Program Enrollment:  Complex Care  Referral from Primary Care Provider:  No  Suggested Interventions and Community Resources  Fall Risk Prevention:  Completed  Home Health Services:  Completed  Medi Set or Pill Pack:  Completed  Occupational Therapy:  Completed  Palliative Care:  Completed (Comment: [facility] [employer] [facility])  Physical Therapy:  Completed  Transportation Support:  Completed  Zone Management Tools:  Completed (Comment: CHF, COPD)         Goals Addressed             Most Recent     Self Monitoring   On track (2/28/2018)             Daily Weights - I will weight myself as directed - Daily and write down weights  I will notify my provider of any increase in weight by 3 or more pounds in 2 days OR 5 or more pounds in a week. Family assists with care.      None Recently Recorded    Barriers: none  Plan for overcoming my barriers: N/A  Confidence: 8/10  Anticipated Goal Completion Date: 4/30/2018              Prior to Admission medications    Medication Sig Start Date End Date Taking? Authorizing Provider   LORazepam (ATIVAN) 0.5 MG tablet Take 1/2 tab during the day and 1 tab at night. May take extra 1/2 tab during the day prn anxiety. . 2/2/18 3/4/18  Fina Paul, DO   bumetanide (BUMEX) 1 MG tablet take 1 tablet by mouth twice a day 1/12/18   Fina Paul, DO   loratadine (CLARITIN) 10 MG tablet take 1 tablet by mouth once daily 12/15/17   Fina Paul, DO   diphenoxylate-atropine (LOMOTIL) 2.5-0.025 MG per tablet Take 1 tablet by mouth 4 times daily as needed for Diarrhea . 11/24/17   Zane Mao, DO   magnesium gluconate (MAGONATE) 500 MG tablet Take 250 mg by mouth 2 times daily    Historical Provider, MD   omeprazole (PRILOSEC) 20 MG delayed release capsule take 1 capsule by mouth twice a day 10/23/17   Fina Paul, DO   hydrocortisone (PROCTOSOL HC) 2.5 % rectal cream Place rectally 2 times daily Place rectally 2 times daily. 10/23/17   Fina Paul, DO   spironolactone (ALDACTONE) 25 MG tablet take 1 tablet by mouth once daily 10/16/17   Fina Paul, DO   allopurinol (ZYLOPRIM) 100 MG tablet Take 2 tablets by mouth daily 9/21/17   Fina Paul, DO   polyethylene glycol [facility]) powder Take 17 g by mouth daily 9/21/17   Airam Orn, DO   Compression Stockings MISC by Does not apply route Dispense 2 pairs, requesting zippered kind. 15-20 mm Hg. 8/28/17   iFna Paul, DO   ondansetron (ZOFRAN) 4 MG tablet Take 1 tablet by mouth every 8 hours as needed for Nausea or Vomiting From [facility] care plan.  8/28/17   Fina Paul, DO   meclizine (ANTIVERT) 25 MG tablet TAKE ONE TABLET BY MOUTH 3 TIMES A DAY AS NEEDED 8/28/17   Fina Paul,    simvastatin (ZOCOR) 10 MG tablet take 1 tablet by mouth every evening 8/16/17   Fina Paul, DO   lisinopril

## 2018-05-30 ENCOUNTER — CARE COORDINATION (OUTPATIENT)
Dept: FAMILY MEDICINE CLINIC | Age: 83
End: 2018-05-30